# Patient Record
Sex: MALE | Race: BLACK OR AFRICAN AMERICAN | NOT HISPANIC OR LATINO | Employment: FULL TIME | URBAN - METROPOLITAN AREA
[De-identification: names, ages, dates, MRNs, and addresses within clinical notes are randomized per-mention and may not be internally consistent; named-entity substitution may affect disease eponyms.]

---

## 2017-03-16 ENCOUNTER — OFFICE VISIT (OUTPATIENT)
Dept: INTERNAL MEDICINE | Facility: CLINIC | Age: 47
End: 2017-03-16
Attending: INTERNAL MEDICINE
Payer: COMMERCIAL

## 2017-03-16 VITALS
TEMPERATURE: 99 F | BODY MASS INDEX: 31.22 KG/M2 | OXYGEN SATURATION: 98 % | HEART RATE: 87 BPM | SYSTOLIC BLOOD PRESSURE: 110 MMHG | DIASTOLIC BLOOD PRESSURE: 70 MMHG | WEIGHT: 223 LBS | HEIGHT: 71 IN

## 2017-03-16 DIAGNOSIS — Z98.84 HISTORY OF GASTRIC RESTRICTIVE SURGERY: ICD-10-CM

## 2017-03-16 DIAGNOSIS — R05.9 COUGH: ICD-10-CM

## 2017-03-16 DIAGNOSIS — Z00.00 ROUTINE ADULT HEALTH MAINTENANCE: Primary | ICD-10-CM

## 2017-03-16 PROCEDURE — 3074F SYST BP LT 130 MM HG: CPT | Mod: S$GLB,,, | Performed by: INTERNAL MEDICINE

## 2017-03-16 PROCEDURE — 99396 PREV VISIT EST AGE 40-64: CPT | Mod: S$GLB,,, | Performed by: INTERNAL MEDICINE

## 2017-03-16 PROCEDURE — 3078F DIAST BP <80 MM HG: CPT | Mod: S$GLB,,, | Performed by: INTERNAL MEDICINE

## 2017-03-16 RX ORDER — FOLIC ACID/MULTIVIT,IRON,MINER 0.4MG-18MG
1 TABLET ORAL DAILY
COMMUNITY

## 2017-03-16 NOTE — MR AVS SNAPSHOT
[facility]  [address] [place] [address], [address] [address]  Phone: [phone]  Fax: [phone]                  Dada MIKE Ted   3/16/2017 10:00 AM   Office Visit    Description:  Male : 1970   Provider:  NICHOLE Campa MD   Department:  [facility]           Reason for Visit     Follow-up     Sinus Problem     Cough     Fever                To Do List           Future Appointments        Provider Department Dept Phone    2017 10:15 AM MD [facility] [phone]      Goals (5 Years of Data)     None      Follow-Up and Disposition     Return in about 1 year (around 3/16/2018).      [facility] On Call     [facility] On Call Nurse Care Line -  Assistance  Registered nurses in the [facility] On Call Center provide clinical advisement, health education, appointment booking, and other advisory services.  Call for this free service at [phone].             Medications           Message regarding Medications     Verify the changes and/or additions to your medication regime listed below are the same as discussed with your clinician today.  If any of these changes or additions are incorrect, please notify your healthcare provider.        STOP taking these medications     VITAMIN D2 50,000 unit capsule Take 50,000 Units by mouth every 7 days.     valsartan (DIOVAN) 320 MG tablet TAKE 1 TABLET(320 MG) BY MOUTH EVERY DAY    cetirizine (ZYRTEC) 10 MG tablet Take 10 mg by mouth once daily.           Verify that the below list of medications is an accurate representation of the medications you are currently taking.  If none reported, the list may be blank. If incorrect, please contact your healthcare provider. Carry this list with you in case of emergency.           Current Medications     cholecalciferol, vitamin D3, 400 unit Chew Take 1 capsule by mouth once daily.    MULTIVIT-MINERALS/FERROUS FUM (MULTI VITAMIN ORAL) Take 4 tablets by mouth once daily.    escitalopram oxalate (LEXAPRO) 10 MG  "tablet TAKE 1 TABLET BY MOUTH DAILY    fluticasone (FLONASE) 50 mcg/actuation nasal spray INHALE TWO SPRAYS IN EACH NOSTRIL ONCE DAILY           Clinical Reference Information           Your Vitals Were     BP Pulse Temp Height Weight SpO2    110/70 87 98.7 °F (37.1 °C) 5' 11" (1.803 m) 101.2 kg (223 lb) 98%    BMI                31.1 kg/m2          Blood Pressure          Most Recent Value    BP  110/70      Allergies as of 3/16/2017     Pcn [Penicillins]      Immunizations Administered on Date of Encounter - 3/16/2017     None      Instructions    Tips for Healthy Living and Routine Preventative Care - 2017                                                             (These guidelines are intended for healthy adults)      1. Exercise  Exercise aerobically with a target heart rate of (220-age) x 0.8  Exercise 30-45 minutes on most days of the week    2. Diet and Supplements- All supplements can be obtained through a varied, healthy diet   Calcium: 1,000 - 1,200 mg each day   8 oz milk, Calcium fortified O.J., or Yogurt = 300 mg, 1oz of cheese =100-200 mg  Vitamin D: 1,000 iu each day- Can probably be obtained by 30 min. of direct sunlight    each day             3 oz. [place] = 800 iu,  3 oz. Tuna =150 iu, Milk or fortified O.J. = 120 iu  Fish oil: 1-2 grams each day or about 840 mg of EPA and DHA (Omega-3 fatty acids) each day             3 oz. [place]=2 grams,  3 oz. Tuna=1.3 grams,  3 oz. drained light Tuna= 0.25 grams  Folic acid 800 mcg each day for all women planning or capable of pregnancy  Fat intake: Not to exceed 30% of total daily calories    3. Lifestyle  Alcohol: 1 drink = 12 oz. domestic beer, 4 oz. wine, or 1 oz. hard (80 proof) liquor             Males: </= 14 drinks per week with no more than 4 in any one day             Females: </= 7 drinks per week with no more than 3 in any one day  Salt: 1.2 - 3 grams of Sodium each day.  Tobacco: Dont smoke, or quit smoking (discuss with your " doctor)  Depression: If you feel depressed discuss with your doctor  Weight: Maintain a healthy body weight. Stay within 10% of:             Males: 106 lbs. + 6 lbs per inch height above 5 feet             Females: 100 lbs + 5 lbs per inch height above 5 feet    4. Routine tests  Blood pressure check at each visit, or at least once each year  HIV screening (one time) if less than 65 years old  Hepatitis C screen (one time) if born between 1945 and 1965  Cholesterol screening every 3 years starting at age 21  Glucose check every 2-3 years starting at age 45  TSH (thyroid screen) every 2 years starting at age 50  Colonoscopy at age 50, and repeat every 10 years until age 75  Vision screen at age 65    Females:  Pap smear every three years starting at age 21                  Stop screening at age 65 if past 3 pap smears were normal                  No screening for women who have had a hysterectomy with removal of cervix  Mammogram every 1-2 years starting at age 40 until age 75 (yearly from age 45-54).  Bone density scan at about age 65      Males:  Consider PSA screening annually at age 50, age 45 for  Americans, until age 75                 5. Immunizations  Influenza vaccine every year in the fall, especially if >50 or with a chronic disease  Tetnus/Diptheria/Pertusis (Tdap) vaccine once, then Tetnus/Diptheria (Td) vaccine every 10 years  Shingles (Zoster) vaccine at age 60  Pneumonia vaccine at age 65. 2nd Pneumonia vaccine at least 1 year later (1st should be Prevnar-13, and 2nd should be Pneumovax-23).                                                       Language Assistance Services     ATTENTION: Language assistance services are available, free of charge. Please call [phone].      ATENCIÓN: Si habla español, tiene a mederos disposición servicios gratuitos de asistencia lingüística. Llame al [phone].     CHÚ Ý: N?u b?n nói Ti?ng Vi?t, có các d?ch v? h? tr? ngôn ng? mi?n phí dành cho b?n. G?i s?  [phone].         [facility] complies with applicable Federal civil rights laws and does not discriminate on the basis of race, color, national origin, age, disability, or sex.

## 2017-03-16 NOTE — PROGRESS NOTES
Subjective:       Patient ID: Dada Jean is a 46 y.o. male.    Chief Complaint: Follow-up; Sinus Problem (nata.); Cough; and Fever    HPI Comments: Lost 90 lb in past 6 mo.  Cough for 1 week.    Cough   This is a new problem. The current episode started in the past 7 days. The problem has been gradually improving. The cough is productive of sputum.     Review of Systems   Constitutional: Negative.    Respiratory: Positive for cough.    Cardiovascular: Negative.    Psychiatric/Behavioral: Negative for dysphoric mood.       Objective:      Physical Exam   Constitutional: He appears well-developed and well-nourished.   HENT:   Head: Normocephalic and atraumatic.   Eyes: Pupils are equal, round, and reactive to light.   Cardiovascular: Normal rate, regular rhythm and normal heart sounds.    Pulmonary/Chest: Effort normal.   Musculoskeletal: He exhibits no edema.   Neurological: He is alert.       Assessment:       1. Routine adult health maintenance    2. History of gastric restrictive surgery    3. Cough        Plan:       Per orders and D/C instructions.  Get copy of recent labs.

## 2017-03-16 NOTE — PATIENT INSTRUCTIONS
Tips for Healthy Living and Routine Preventative Care - 2017                                                             (These guidelines are intended for healthy adults)      1. Exercise  Exercise aerobically with a target heart rate of (220-age) x 0.8  Exercise 30-45 minutes on most days of the week    2. Diet and Supplements- All supplements can be obtained through a varied, healthy diet   Calcium: 1,000 - 1,200 mg each day   8 oz milk, Calcium fortified O.J., or Yogurt = 300 mg, 1oz of cheese =100-200 mg  Vitamin D: 1,000 iu each day- Can probably be obtained by 30 min. of direct sunlight    each day             3 oz. [place] = 800 iu,  3 oz. Tuna =150 iu, Milk or fortified O.J. = 120 iu  Fish oil: 1-2 grams each day or about 840 mg of EPA and DHA (Omega-3 fatty acids) each day             3 oz. [place]=2 grams,  3 oz. Tuna=1.3 grams,  3 oz. drained light Tuna= 0.25 grams  Folic acid 800 mcg each day for all women planning or capable of pregnancy  Fat intake: Not to exceed 30% of total daily calories    3. Lifestyle  Alcohol: 1 drink = 12 oz. domestic beer, 4 oz. wine, or 1 oz. hard (80 proof) liquor             Males: </= 14 drinks per week with no more than 4 in any one day             Females: </= 7 drinks per week with no more than 3 in any one day  Salt: 1.2 - 3 grams of Sodium each day.  Tobacco: Dont smoke, or quit smoking (discuss with your doctor)  Depression: If you feel depressed discuss with your doctor  Weight: Maintain a healthy body weight. Stay within 10% of:             Males: 106 lbs. + 6 lbs per inch height above 5 feet             Females: 100 lbs + 5 lbs per inch height above 5 feet    4. Routine tests  Blood pressure check at each visit, or at least once each year  HIV screening (one time) if less than 65 years old  Hepatitis C screen (one time) if born between 1945 and 1965  Cholesterol screening every 3 years starting at age 21  Glucose check every 2-3 years starting at age 45  TSH  (thyroid screen) every 2 years starting at age 50  Colonoscopy at age 50, and repeat every 10 years until age 75  Vision screen at age 65    Females:  Pap smear every three years starting at age 21                  Stop screening at age 65 if past 3 pap smears were normal                  No screening for women who have had a hysterectomy with removal of cervix  Mammogram every 1-2 years starting at age 40 until age 75 (yearly from age 45-54).  Bone density scan at about age 65      Males:  Consider PSA screening annually at age 50, age 45 for  Americans, until age 75                 5. Immunizations  Influenza vaccine every year in the fall, especially if >50 or with a chronic disease  Tetnus/Diptheria/Pertusis (Tdap) vaccine once, then Tetnus/Diptheria (Td) vaccine every 10 years  Shingles (Zoster) vaccine at age 60  Pneumonia vaccine at age 65. 2nd Pneumonia vaccine at least 1 year later (1st should be Prevnar-13, and 2nd should be Pneumovax-23).

## 2017-12-11 ENCOUNTER — OFFICE VISIT (OUTPATIENT)
Dept: INTERNAL MEDICINE | Facility: CLINIC | Age: 47
End: 2017-12-11
Attending: INTERNAL MEDICINE
Payer: COMMERCIAL

## 2017-12-11 VITALS
SYSTOLIC BLOOD PRESSURE: 112 MMHG | OXYGEN SATURATION: 98 % | BODY MASS INDEX: 29.4 KG/M2 | DIASTOLIC BLOOD PRESSURE: 72 MMHG | HEIGHT: 71 IN | WEIGHT: 210 LBS | HEART RATE: 57 BPM

## 2017-12-11 DIAGNOSIS — Z98.84 HISTORY OF GASTRIC RESTRICTIVE SURGERY: ICD-10-CM

## 2017-12-11 DIAGNOSIS — E55.9 VITAMIN D DEFICIENCY: Primary | ICD-10-CM

## 2017-12-11 PROCEDURE — 99213 OFFICE O/P EST LOW 20 MIN: CPT | Mod: 25,S$GLB,, | Performed by: INTERNAL MEDICINE

## 2017-12-11 PROCEDURE — 90471 IMMUNIZATION ADMIN: CPT | Mod: S$GLB,,, | Performed by: INTERNAL MEDICINE

## 2017-12-11 PROCEDURE — 90686 IIV4 VACC NO PRSV 0.5 ML IM: CPT | Mod: S$GLB,,, | Performed by: INTERNAL MEDICINE

## 2017-12-11 NOTE — PROGRESS NOTES
Subjective:       Patient ID: Dada Jean is a 47 y.o. male.    Chief Complaint: Follow-up (follow up)    Lost over 100 lb since gastric sleeve.      Review of Systems   Constitutional: Negative.    Respiratory: Negative.    Cardiovascular: Negative.        Objective:      Physical Exam   Constitutional: He appears well-developed and well-nourished.   HENT:   Head: Normocephalic and atraumatic.   Eyes: Pupils are equal, round, and reactive to light.   Cardiovascular: Normal rate, regular rhythm and normal heart sounds.    Pulmonary/Chest: Effort normal.   Musculoskeletal: He exhibits no edema.   Neurological: He is alert.       Assessment:       1. Vitamin D deficiency    2. History of gastric restrictive surgery        Plan:       Per orders and D/C instructions.  Healthy.

## 2017-12-15 DIAGNOSIS — F41.9 ANXIETY: Primary | ICD-10-CM

## 2017-12-15 RX ORDER — ESCITALOPRAM OXALATE 10 MG/1
10 TABLET ORAL DAILY
Qty: 90 TABLET | Refills: 3 | Status: SHIPPED | OUTPATIENT
Start: 2017-12-15 | End: 2018-12-19 | Stop reason: SDUPTHER

## 2018-06-21 ENCOUNTER — OFFICE VISIT (OUTPATIENT)
Dept: INTERNAL MEDICINE | Facility: CLINIC | Age: 48
End: 2018-06-21
Attending: INTERNAL MEDICINE
Payer: COMMERCIAL

## 2018-06-21 ENCOUNTER — LAB VISIT (OUTPATIENT)
Dept: LAB | Facility: OTHER | Age: 48
End: 2018-06-21
Attending: INTERNAL MEDICINE
Payer: COMMERCIAL

## 2018-06-21 VITALS
HEIGHT: 71 IN | BODY MASS INDEX: 29.4 KG/M2 | DIASTOLIC BLOOD PRESSURE: 72 MMHG | SYSTOLIC BLOOD PRESSURE: 110 MMHG | WEIGHT: 210 LBS | HEART RATE: 59 BPM | OXYGEN SATURATION: 98 %

## 2018-06-21 DIAGNOSIS — D51.0 PERNICIOUS ANEMIA: ICD-10-CM

## 2018-06-21 DIAGNOSIS — Z12.5 SCREENING FOR PROSTATE CANCER: ICD-10-CM

## 2018-06-21 DIAGNOSIS — D50.8 OTHER IRON DEFICIENCY ANEMIA: ICD-10-CM

## 2018-06-21 DIAGNOSIS — R79.89 OTHER SPECIFIED ABNORMAL FINDINGS OF BLOOD CHEMISTRY: ICD-10-CM

## 2018-06-21 DIAGNOSIS — E03.9 HYPOTHYROIDISM, UNSPECIFIED TYPE: ICD-10-CM

## 2018-06-21 DIAGNOSIS — E78.9 DISORDER OF LIPID METABOLISM: ICD-10-CM

## 2018-06-21 DIAGNOSIS — Z00.00 ROUTINE ADULT HEALTH MAINTENANCE: Primary | ICD-10-CM

## 2018-06-21 DIAGNOSIS — Z00.00 ROUTINE ADULT HEALTH MAINTENANCE: ICD-10-CM

## 2018-06-21 DIAGNOSIS — E78.00 HIGH CHOLESTEROL: Primary | ICD-10-CM

## 2018-06-21 DIAGNOSIS — E55.9 VITAMIN D DEFICIENCY: ICD-10-CM

## 2018-06-21 LAB
25(OH)D3+25(OH)D2 SERPL-MCNC: 55 NG/ML
ALBUMIN SERPL BCP-MCNC: 4.1 G/DL
ALP SERPL-CCNC: 63 U/L
ALT SERPL W/O P-5'-P-CCNC: 21 U/L
ANION GAP SERPL CALC-SCNC: 10 MMOL/L
AST SERPL-CCNC: 20 U/L
BASOPHILS # BLD AUTO: 0.03 K/UL
BASOPHILS NFR BLD: 0.9 %
BILIRUB SERPL-MCNC: 0.9 MG/DL
BUN SERPL-MCNC: 12 MG/DL
CALCIUM SERPL-MCNC: 9.8 MG/DL
CHLORIDE SERPL-SCNC: 103 MMOL/L
CHOLEST SERPL-MCNC: 187 MG/DL
CHOLEST/HDLC SERPL: 2.8 {RATIO}
CO2 SERPL-SCNC: 31 MMOL/L
COMPLEXED PSA SERPL-MCNC: 0.14 NG/ML
CREAT SERPL-MCNC: 1 MG/DL
DIFFERENTIAL METHOD: ABNORMAL
EOSINOPHIL # BLD AUTO: 0.1 K/UL
EOSINOPHIL NFR BLD: 4.4 %
ERYTHROCYTE [DISTWIDTH] IN BLOOD BY AUTOMATED COUNT: 12.8 %
EST. GFR  (AFRICAN AMERICAN): >60 ML/MIN/1.73 M^2
EST. GFR  (NON AFRICAN AMERICAN): >60 ML/MIN/1.73 M^2
ESTIMATED AVG GLUCOSE: 100 MG/DL
GLUCOSE SERPL-MCNC: 91 MG/DL
HBA1C MFR BLD HPLC: 5.1 %
HCT VFR BLD AUTO: 42.6 %
HDLC SERPL-MCNC: 67 MG/DL
HDLC SERPL: 35.8 %
HGB BLD-MCNC: 14.7 G/DL
IRON SERPL-MCNC: 118 UG/DL
LDLC SERPL CALC-MCNC: 109.6 MG/DL
LYMPHOCYTES # BLD AUTO: 1.2 K/UL
LYMPHOCYTES NFR BLD: 36.4 %
MCH RBC QN AUTO: 30.7 PG
MCHC RBC AUTO-ENTMCNC: 34.5 G/DL
MCV RBC AUTO: 89 FL
MONOCYTES # BLD AUTO: 0.2 K/UL
MONOCYTES NFR BLD: 5.3 %
NEUTROPHILS # BLD AUTO: 1.7 K/UL
NEUTROPHILS NFR BLD: 53 %
NONHDLC SERPL-MCNC: 120 MG/DL
PLATELET # BLD AUTO: 151 K/UL
PMV BLD AUTO: 11.1 FL
POTASSIUM SERPL-SCNC: 4.4 MMOL/L
PROT SERPL-MCNC: 7.2 G/DL
RBC # BLD AUTO: 4.79 M/UL
SATURATED IRON: 34 %
SODIUM SERPL-SCNC: 144 MMOL/L
TOTAL IRON BINDING CAPACITY: 345 UG/DL
TRANSFERRIN SERPL-MCNC: 233 MG/DL
TRIGL SERPL-MCNC: 52 MG/DL
TSH SERPL DL<=0.005 MIU/L-ACNC: 1.44 UIU/ML
VIT B12 SERPL-MCNC: 353 PG/ML
WBC # BLD AUTO: 3.19 K/UL

## 2018-06-21 PROCEDURE — 84153 ASSAY OF PSA TOTAL: CPT

## 2018-06-21 PROCEDURE — 3074F SYST BP LT 130 MM HG: CPT | Mod: CPTII,S$GLB,, | Performed by: INTERNAL MEDICINE

## 2018-06-21 PROCEDURE — 80053 COMPREHEN METABOLIC PANEL: CPT

## 2018-06-21 PROCEDURE — 99396 PREV VISIT EST AGE 40-64: CPT | Mod: S$GLB,,, | Performed by: INTERNAL MEDICINE

## 2018-06-21 PROCEDURE — 3078F DIAST BP <80 MM HG: CPT | Mod: CPTII,S$GLB,, | Performed by: INTERNAL MEDICINE

## 2018-06-21 PROCEDURE — 82607 VITAMIN B-12: CPT

## 2018-06-21 PROCEDURE — 85025 COMPLETE CBC W/AUTO DIFF WBC: CPT

## 2018-06-21 PROCEDURE — 84443 ASSAY THYROID STIM HORMONE: CPT

## 2018-06-21 PROCEDURE — 83036 HEMOGLOBIN GLYCOSYLATED A1C: CPT

## 2018-06-21 PROCEDURE — 83540 ASSAY OF IRON: CPT

## 2018-06-21 PROCEDURE — 82306 VITAMIN D 25 HYDROXY: CPT

## 2018-06-21 PROCEDURE — 84425 ASSAY OF VITAMIN B-1: CPT

## 2018-06-21 PROCEDURE — 80061 LIPID PANEL: CPT

## 2018-06-21 NOTE — PROGRESS NOTES
Subjective:       Patient ID: Dada Jean is a 47 y.o. male.    Chief Complaint: Follow-up (6 month) and Hand Pain (right and left thumb joints)    Left thumb pain.      Adult Wellness Exam:    Mental Conditions: None  Depression Risk Annual Factors: None  BMI: See Vital signs   Colon screen:    See Health Maintenance Report      Females: Mammogram and PAP: per Gyn                                 Vaccines (Flu, Adacel, Shingrix): See Health Maintenance Report  Routine labs (Cholesterol, Glucose/Hgb A1C, and TSH): Done or ordered.     The patient's current health status is: Good   Patient was educated on all medical problems and routine health maintanence. See Patient Instructions.                               Review of Systems   Constitutional: Negative.    Respiratory: Negative.    Cardiovascular: Negative.        Objective:      Physical Exam   Constitutional: He appears well-developed and well-nourished.   HENT:   Head: Normocephalic and atraumatic.   Eyes: Pupils are equal, round, and reactive to light.   Cardiovascular: Normal rate, regular rhythm and normal heart sounds.    Pulmonary/Chest: Effort normal.   Musculoskeletal: He exhibits no edema.        Left hand: He exhibits tenderness.        Hands:  Tender thumb tendon   Neurological: He is alert.       Assessment:       1. High cholesterol    2. Routine adult health maintenance        Plan:       Per orders and D/C instructions.     Wear a left thumb splint during the day  Check labs.

## 2018-12-19 DIAGNOSIS — F41.9 ANXIETY: ICD-10-CM

## 2018-12-19 RX ORDER — ESCITALOPRAM OXALATE 10 MG/1
TABLET ORAL
Qty: 90 TABLET | Refills: 0 | Status: SHIPPED | OUTPATIENT
Start: 2018-12-19 | End: 2019-03-22 | Stop reason: SDUPTHER

## 2018-12-21 ENCOUNTER — OFFICE VISIT (OUTPATIENT)
Dept: INTERNAL MEDICINE | Facility: CLINIC | Age: 48
End: 2018-12-21
Attending: INTERNAL MEDICINE
Payer: COMMERCIAL

## 2018-12-21 VITALS
SYSTOLIC BLOOD PRESSURE: 118 MMHG | BODY MASS INDEX: 30.94 KG/M2 | HEIGHT: 71 IN | OXYGEN SATURATION: 97 % | HEART RATE: 78 BPM | WEIGHT: 221 LBS | DIASTOLIC BLOOD PRESSURE: 80 MMHG

## 2018-12-21 DIAGNOSIS — E78.00 HIGH CHOLESTEROL: Primary | ICD-10-CM

## 2018-12-21 DIAGNOSIS — R05.9 COUGH: ICD-10-CM

## 2018-12-21 PROCEDURE — 99213 OFFICE O/P EST LOW 20 MIN: CPT | Mod: 25,S$GLB,, | Performed by: INTERNAL MEDICINE

## 2018-12-21 PROCEDURE — 90471 IMMUNIZATION ADMIN: CPT | Mod: S$GLB,,, | Performed by: INTERNAL MEDICINE

## 2018-12-21 PROCEDURE — 90686 IIV4 VACC NO PRSV 0.5 ML IM: CPT | Mod: S$GLB,,, | Performed by: INTERNAL MEDICINE

## 2018-12-21 PROCEDURE — 3079F DIAST BP 80-89 MM HG: CPT | Mod: CPTII,S$GLB,, | Performed by: INTERNAL MEDICINE

## 2018-12-21 PROCEDURE — 3008F BODY MASS INDEX DOCD: CPT | Mod: CPTII,S$GLB,, | Performed by: INTERNAL MEDICINE

## 2018-12-21 PROCEDURE — 3074F SYST BP LT 130 MM HG: CPT | Mod: CPTII,S$GLB,, | Performed by: INTERNAL MEDICINE

## 2018-12-21 NOTE — PROGRESS NOTES
Subjective:       Patient ID: Dada Jean is a 48 y.o. male.    Chief Complaint: Follow-up (6 month) and Cough    Cough   This is a new problem. The current episode started 1 to 4 weeks ago. The problem has been resolved. The cough is productive of sputum.     Review of Systems   Constitutional: Negative.    Respiratory: Positive for cough.    Cardiovascular: Negative.        Objective:      Physical Exam   Constitutional: He appears well-developed and well-nourished.   HENT:   Head: Normocephalic and atraumatic.   Eyes: Pupils are equal, round, and reactive to light.   Cardiovascular: Normal rate, regular rhythm and normal heart sounds.   Pulmonary/Chest: Effort normal.   Musculoskeletal: He exhibits no edema.   Neurological: He is alert.       Assessment:       1. High cholesterol    2. Cough        Plan:       Per orders and D/C instructions.  Doing well.

## 2019-03-22 DIAGNOSIS — F41.9 ANXIETY: ICD-10-CM

## 2019-03-22 RX ORDER — ESCITALOPRAM OXALATE 10 MG/1
TABLET ORAL
Qty: 90 TABLET | Refills: 1 | Status: SHIPPED | OUTPATIENT
Start: 2019-03-22 | End: 2019-08-01 | Stop reason: SDUPTHER

## 2019-06-21 ENCOUNTER — OFFICE VISIT (OUTPATIENT)
Dept: INTERNAL MEDICINE | Facility: CLINIC | Age: 49
End: 2019-06-21
Attending: INTERNAL MEDICINE
Payer: COMMERCIAL

## 2019-06-21 ENCOUNTER — LAB VISIT (OUTPATIENT)
Dept: LAB | Facility: OTHER | Age: 49
End: 2019-06-21
Attending: INTERNAL MEDICINE
Payer: COMMERCIAL

## 2019-06-21 VITALS
BODY MASS INDEX: 31.19 KG/M2 | SYSTOLIC BLOOD PRESSURE: 124 MMHG | WEIGHT: 222.81 LBS | DIASTOLIC BLOOD PRESSURE: 80 MMHG | HEART RATE: 45 BPM | HEIGHT: 71 IN | OXYGEN SATURATION: 99 %

## 2019-06-21 DIAGNOSIS — E55.9 VITAMIN D DEFICIENCY: ICD-10-CM

## 2019-06-21 DIAGNOSIS — Z00.00 ROUTINE ADULT HEALTH MAINTENANCE: ICD-10-CM

## 2019-06-21 DIAGNOSIS — R79.89 OTHER SPECIFIED ABNORMAL FINDINGS OF BLOOD CHEMISTRY: ICD-10-CM

## 2019-06-21 DIAGNOSIS — D51.0 PERNICIOUS ANEMIA: ICD-10-CM

## 2019-06-21 DIAGNOSIS — E03.9 HYPOTHYROIDISM, UNSPECIFIED TYPE: ICD-10-CM

## 2019-06-21 DIAGNOSIS — Z00.00 ROUTINE ADULT HEALTH MAINTENANCE: Primary | ICD-10-CM

## 2019-06-21 DIAGNOSIS — Z12.5 SCREENING FOR PROSTATE CANCER: ICD-10-CM

## 2019-06-21 DIAGNOSIS — D50.8 OTHER IRON DEFICIENCY ANEMIA: ICD-10-CM

## 2019-06-21 DIAGNOSIS — E78.9 DISORDER OF LIPID METABOLISM: ICD-10-CM

## 2019-06-21 DIAGNOSIS — E78.00 HIGH CHOLESTEROL: Primary | ICD-10-CM

## 2019-06-21 LAB
25(OH)D3+25(OH)D2 SERPL-MCNC: 49 NG/ML (ref 30–96)
ALBUMIN SERPL BCP-MCNC: 4.3 G/DL (ref 3.5–5.2)
ALP SERPL-CCNC: 65 U/L (ref 55–135)
ALT SERPL W/O P-5'-P-CCNC: 22 U/L (ref 10–44)
ANION GAP SERPL CALC-SCNC: 7 MMOL/L (ref 8–16)
AST SERPL-CCNC: 21 U/L (ref 10–40)
BASOPHILS # BLD AUTO: 0.03 K/UL (ref 0–0.2)
BASOPHILS NFR BLD: 0.8 % (ref 0–1.9)
BILIRUB SERPL-MCNC: 0.7 MG/DL (ref 0.1–1)
BUN SERPL-MCNC: 16 MG/DL (ref 6–20)
CALCIUM SERPL-MCNC: 9.6 MG/DL (ref 8.7–10.5)
CHLORIDE SERPL-SCNC: 103 MMOL/L (ref 95–110)
CHOLEST SERPL-MCNC: 201 MG/DL (ref 120–199)
CHOLEST/HDLC SERPL: 2.8 {RATIO} (ref 2–5)
CO2 SERPL-SCNC: 31 MMOL/L (ref 23–29)
COMPLEXED PSA SERPL-MCNC: 0.12 NG/ML (ref 0–4)
CREAT SERPL-MCNC: 1.1 MG/DL (ref 0.5–1.4)
DIFFERENTIAL METHOD: ABNORMAL
EOSINOPHIL # BLD AUTO: 0.2 K/UL (ref 0–0.5)
EOSINOPHIL NFR BLD: 4.1 % (ref 0–8)
ERYTHROCYTE [DISTWIDTH] IN BLOOD BY AUTOMATED COUNT: 13 % (ref 11.5–14.5)
EST. GFR  (AFRICAN AMERICAN): >60 ML/MIN/1.73 M^2
EST. GFR  (NON AFRICAN AMERICAN): >60 ML/MIN/1.73 M^2
ESTIMATED AVG GLUCOSE: 103 MG/DL (ref 68–131)
GLUCOSE SERPL-MCNC: 91 MG/DL (ref 70–110)
HBA1C MFR BLD HPLC: 5.2 % (ref 4–5.6)
HCT VFR BLD AUTO: 42.4 % (ref 40–54)
HDLC SERPL-MCNC: 71 MG/DL (ref 40–75)
HDLC SERPL: 35.3 % (ref 20–50)
HGB BLD-MCNC: 14.2 G/DL (ref 14–18)
LDLC SERPL CALC-MCNC: 114.8 MG/DL (ref 63–159)
LYMPHOCYTES # BLD AUTO: 1.2 K/UL (ref 1–4.8)
LYMPHOCYTES NFR BLD: 33.6 % (ref 18–48)
MCH RBC QN AUTO: 29.8 PG (ref 27–31)
MCHC RBC AUTO-ENTMCNC: 33.5 G/DL (ref 32–36)
MCV RBC AUTO: 89 FL (ref 82–98)
MONOCYTES # BLD AUTO: 0.2 K/UL (ref 0.3–1)
MONOCYTES NFR BLD: 6.1 % (ref 4–15)
NEUTROPHILS # BLD AUTO: 2 K/UL (ref 1.8–7.7)
NEUTROPHILS NFR BLD: 55.4 % (ref 38–73)
NONHDLC SERPL-MCNC: 130 MG/DL
PLATELET # BLD AUTO: 167 K/UL (ref 150–350)
PMV BLD AUTO: 11.3 FL (ref 9.2–12.9)
POTASSIUM SERPL-SCNC: 4.6 MMOL/L (ref 3.5–5.1)
PROT SERPL-MCNC: 7.4 G/DL (ref 6–8.4)
RBC # BLD AUTO: 4.77 M/UL (ref 4.6–6.2)
SODIUM SERPL-SCNC: 141 MMOL/L (ref 136–145)
TRIGL SERPL-MCNC: 76 MG/DL (ref 30–150)
TSH SERPL DL<=0.005 MIU/L-ACNC: 1.27 UIU/ML (ref 0.4–4)
VIT B12 SERPL-MCNC: 400 PG/ML (ref 210–950)
WBC # BLD AUTO: 3.63 K/UL (ref 3.9–12.7)

## 2019-06-21 PROCEDURE — 99396 PR PREVENTIVE VISIT,EST,40-64: ICD-10-PCS | Mod: S$GLB,,, | Performed by: INTERNAL MEDICINE

## 2019-06-21 PROCEDURE — 82306 VITAMIN D 25 HYDROXY: CPT

## 2019-06-21 PROCEDURE — 84443 ASSAY THYROID STIM HORMONE: CPT

## 2019-06-21 PROCEDURE — 84153 ASSAY OF PSA TOTAL: CPT

## 2019-06-21 PROCEDURE — 3074F PR MOST RECENT SYSTOLIC BLOOD PRESSURE < 130 MM HG: ICD-10-PCS | Mod: CPTII,S$GLB,, | Performed by: INTERNAL MEDICINE

## 2019-06-21 PROCEDURE — 80053 COMPREHEN METABOLIC PANEL: CPT

## 2019-06-21 PROCEDURE — 3079F DIAST BP 80-89 MM HG: CPT | Mod: CPTII,S$GLB,, | Performed by: INTERNAL MEDICINE

## 2019-06-21 PROCEDURE — 36415 COLL VENOUS BLD VENIPUNCTURE: CPT

## 2019-06-21 PROCEDURE — 85025 COMPLETE CBC W/AUTO DIFF WBC: CPT

## 2019-06-21 PROCEDURE — 3079F PR MOST RECENT DIASTOLIC BLOOD PRESSURE 80-89 MM HG: ICD-10-PCS | Mod: CPTII,S$GLB,, | Performed by: INTERNAL MEDICINE

## 2019-06-21 PROCEDURE — 99396 PREV VISIT EST AGE 40-64: CPT | Mod: S$GLB,,, | Performed by: INTERNAL MEDICINE

## 2019-06-21 PROCEDURE — 3074F SYST BP LT 130 MM HG: CPT | Mod: CPTII,S$GLB,, | Performed by: INTERNAL MEDICINE

## 2019-06-21 PROCEDURE — 82607 VITAMIN B-12: CPT

## 2019-06-21 PROCEDURE — 80061 LIPID PANEL: CPT

## 2019-06-21 PROCEDURE — 83036 HEMOGLOBIN GLYCOSYLATED A1C: CPT

## 2019-06-21 NOTE — PATIENT INSTRUCTIONS
Tips for Healthy Living and Routine Preventative Care - 2019                                                            (These guidelines are intended for healthy adults)      1. Exercise  Exercise aerobically with a target heart rate of (220-age) x 0.8  Exercise 30-45 minutes on most days of the week    2. Diet and Supplements- All supplements can be obtained through a varied, healthy diet   Calcium: 1,000 - 1,200 mg each day   8 oz milk, Calcium fortified O.J., or Yogurt = 300 mg, 1oz of cheese =100-200 mg  Vitamin D: 800 iu each day- Can probably be obtained by 30 min. of direct sunlight    each day             3 oz. [place] = 800 iu,  3 oz. Tuna =150 iu, Milk or fortified O.J. = 120 iu  Fish oil: 1-2 grams each day or about 840 mg of EPA and DHA (Omega-3 fatty acids) each day             3 oz. [place]=2 grams,  3 oz. Tuna=1.3 grams,  3 oz. drained light Tuna= 0.25 grams  Folic acid 800 mcg each day for all women planning or capable of pregnancy    3. Lifestyle  Alcohol: 1 drink = 12 oz. domestic beer, 4 oz. wine, or 1 oz. hard (80 proof) liquor             Males: </= 14 drinks per week with no more than 4 in any one day             Females: </= 7 drinks per week with no more than 3 in any one day  Salt: 1.2 - 3 grams of Sodium each day.  Tobacco: Dont smoke, or quit smoking (discuss with your doctor)  Depression: If you feel depressed discuss with your doctor  Weight: Maintain a healthy body weight. Stay within 10% of:             Males: 106 lbs. + 6 lbs per inch height above 5 feet             Females: 100 lbs + 5 lbs per inch height above 5 feet    4. Routine tests  Blood pressure check at each visit, or at least once each year  HIV screening (one time) if less than 65 years old  Hepatitis C screen (one time) if born between 1945 and 1965  Cholesterol screening every 3 years starting at age 21  Glucose check every 2-3 years starting at age 45  TSH (thyroid screen) every 2 years starting at age 50  Colonoscopy  at age 50, and repeat every 10 years until age 75  Vision screen at age 65    Females:  Pap smear every three years starting at age 21                  Stop screening at age 65 if past 3 pap smears were normal                  No screening for women who have had a hysterectomy with removal of cervix  Mammogram every 1-2 years starting at age 40 until age 75 (yearly from age 45-54).  Bone density scan at about age 65      Males:  Consider PSA screening annually at age 50, age 45 for  Americans, until age 75                 5. Immunizations  Influenza vaccine every year in the fall, especially if >50 or with a chronic disease  Tetnus/Diptheria/Pertusis (Tdap) vaccine once (after the age of 18), then Tetnus/Diptheria (Td) vaccine every 10 years  Shingles (Shingrix) vaccine after age 50 and get a 2nd dose after 2-6 months  Pneumonia vaccine at age 65. 2nd Pneumonia vaccine at least 1 year later (1st should be Prevnar-13, and 2nd should be Pneumovax-23).

## 2019-06-21 NOTE — PROGRESS NOTES
Subjective:       Patient ID: Dada Jean is a 48 y.o. male.    Chief Complaint: Follow-up (6 month / wants bloodwork)      Adult Wellness Exam:    Mental Conditions: None  Depression Risk Annual Factors: None  BMI: See Vital signs   Colon screen:    See Health Maintenance Report      Females: Mammogram and PAP: per Gyn                                 Vaccines (Flu, Adacel, Shingrix): See Health Maintenance Report  Routine labs (Cholesterol, Glucose/Hgb A1C, and TSH): Done or ordered.     The patient's current health status is: Good   Patient was educated on all medical problems and routine health maintanence. See Patient Instructions.                             Review of Systems   Constitutional: Negative.    Respiratory: Negative.    Cardiovascular: Negative.        Objective:      Physical Exam   Constitutional: He appears well-developed and well-nourished.   HENT:   Head: Normocephalic and atraumatic.   Eyes: Pupils are equal, round, and reactive to light.   Cardiovascular: Normal rate, regular rhythm and normal heart sounds.   Pulmonary/Chest: Effort normal.   Musculoskeletal: He exhibits no edema.   Neurological: He is alert.       Assessment:       1. High cholesterol    2. Routine adult health maintenance        Plan:       Per orders and D/C instructions.  Check labs for high cholesterol.

## 2019-08-01 DIAGNOSIS — F41.9 ANXIETY: ICD-10-CM

## 2019-08-01 RX ORDER — ESCITALOPRAM OXALATE 10 MG/1
TABLET ORAL
Qty: 90 TABLET | Refills: 1 | Status: SHIPPED | OUTPATIENT
Start: 2019-08-01 | End: 2019-10-08

## 2019-10-08 DIAGNOSIS — F41.9 ANXIETY: ICD-10-CM

## 2019-10-08 RX ORDER — ESCITALOPRAM OXALATE 10 MG/1
TABLET ORAL
Qty: 90 TABLET | Refills: 1 | Status: SHIPPED | OUTPATIENT
Start: 2019-10-08 | End: 2020-03-28

## 2019-11-25 ENCOUNTER — OFFICE VISIT (OUTPATIENT)
Dept: INTERNAL MEDICINE | Facility: CLINIC | Age: 49
End: 2019-11-25
Attending: INTERNAL MEDICINE
Payer: COMMERCIAL

## 2019-11-25 VITALS
WEIGHT: 238 LBS | BODY MASS INDEX: 33.32 KG/M2 | SYSTOLIC BLOOD PRESSURE: 120 MMHG | DIASTOLIC BLOOD PRESSURE: 80 MMHG | HEIGHT: 71 IN | HEART RATE: 64 BPM | OXYGEN SATURATION: 98 %

## 2019-11-25 DIAGNOSIS — K62.5 BRIGHT RED RECTAL BLEEDING: ICD-10-CM

## 2019-11-25 DIAGNOSIS — Z00.00 ROUTINE ADULT HEALTH MAINTENANCE: ICD-10-CM

## 2019-11-25 DIAGNOSIS — M25.561 ACUTE PAIN OF RIGHT KNEE: ICD-10-CM

## 2019-11-25 DIAGNOSIS — Z98.84 HISTORY OF GASTRIC RESTRICTIVE SURGERY: Primary | ICD-10-CM

## 2019-11-25 DIAGNOSIS — E78.00 HIGH CHOLESTEROL: ICD-10-CM

## 2019-11-25 PROCEDURE — 99214 PR OFFICE/OUTPT VISIT, EST, LEVL IV, 30-39 MIN: ICD-10-PCS | Mod: S$GLB,,, | Performed by: INTERNAL MEDICINE

## 2019-11-25 PROCEDURE — 3079F PR MOST RECENT DIASTOLIC BLOOD PRESSURE 80-89 MM HG: ICD-10-PCS | Mod: CPTII,S$GLB,, | Performed by: INTERNAL MEDICINE

## 2019-11-25 PROCEDURE — 3074F PR MOST RECENT SYSTOLIC BLOOD PRESSURE < 130 MM HG: ICD-10-PCS | Mod: CPTII,S$GLB,, | Performed by: INTERNAL MEDICINE

## 2019-11-25 PROCEDURE — 99214 OFFICE O/P EST MOD 30 MIN: CPT | Mod: S$GLB,,, | Performed by: INTERNAL MEDICINE

## 2019-11-25 PROCEDURE — 3008F PR BODY MASS INDEX (BMI) DOCUMENTED: ICD-10-PCS | Mod: CPTII,S$GLB,, | Performed by: INTERNAL MEDICINE

## 2019-11-25 PROCEDURE — 3079F DIAST BP 80-89 MM HG: CPT | Mod: CPTII,S$GLB,, | Performed by: INTERNAL MEDICINE

## 2019-11-25 PROCEDURE — 3008F BODY MASS INDEX DOCD: CPT | Mod: CPTII,S$GLB,, | Performed by: INTERNAL MEDICINE

## 2019-11-25 PROCEDURE — 3074F SYST BP LT 130 MM HG: CPT | Mod: CPTII,S$GLB,, | Performed by: INTERNAL MEDICINE

## 2019-11-25 NOTE — PROGRESS NOTES
Subjective:       Patient ID: Dada Jean is a 49 y.o. male.    Chief Complaint: Follow-up (6 month); Rectal Bleeding (in stools ); and Knee Injury (fell and hit knee, swollen, tender to touch)    He fell 1 week ago and landed on his right knee.  The knee has been swollen.  A friend of his who is an EMT put a needle and drained a bit of blood.  It does seem to be improving.  3 times he had a bowel movement which resulted in some bright red blood on the paper, but not in the bowl or stool.  It has since resolved.    Rectal Bleeding   This is a new problem. The current episode started in the past 7 days. The problem occurs daily. The problem has been resolved. Associated symptoms include arthralgias.   Knee Injury   This is a new problem. The current episode started in the past 7 days. Associated symptoms include arthralgias.     Review of Systems   Constitutional: Negative.    Respiratory: Negative.    Cardiovascular: Negative.    Gastrointestinal: Positive for anal bleeding and hematochezia.   Musculoskeletal: Positive for arthralgias.       Objective:      Physical Exam   Constitutional: He appears well-developed and well-nourished.   HENT:   Head: Normocephalic and atraumatic.   Eyes: Pupils are equal, round, and reactive to light.   Cardiovascular: Normal rate, regular rhythm and normal heart sounds.   Pulmonary/Chest: Effort normal.   Musculoskeletal: He exhibits no edema.        Right knee: He exhibits swelling.   Neurological: He is alert.       Assessment:       1. History of gastric restrictive surgery    2. High cholesterol    3. Routine adult health maintenance    4. Acute pain of right knee    5. Bright red rectal bleeding        Plan:       Per orders and D/C instructions.  Continue meds/diet for S/p Gastric sleeve and high cholesterol, which are stable.     he will use tucks pads as needed.  He will make an appointment with GI for a colonoscopy.  He will wear a soft knee brace for right knee pain  during the day.

## 2020-03-27 DIAGNOSIS — F41.9 ANXIETY: ICD-10-CM

## 2020-03-28 RX ORDER — ESCITALOPRAM OXALATE 10 MG/1
TABLET ORAL
Qty: 90 TABLET | Refills: 1 | Status: SHIPPED | OUTPATIENT
Start: 2020-03-28 | End: 2020-11-01

## 2020-06-25 ENCOUNTER — OFFICE VISIT (OUTPATIENT)
Dept: INTERNAL MEDICINE | Facility: CLINIC | Age: 50
End: 2020-06-25
Attending: INTERNAL MEDICINE
Payer: COMMERCIAL

## 2020-06-25 ENCOUNTER — LAB VISIT (OUTPATIENT)
Dept: LAB | Facility: OTHER | Age: 50
End: 2020-06-25
Attending: INTERNAL MEDICINE
Payer: COMMERCIAL

## 2020-06-25 VITALS
OXYGEN SATURATION: 98 % | BODY MASS INDEX: 33.18 KG/M2 | SYSTOLIC BLOOD PRESSURE: 110 MMHG | DIASTOLIC BLOOD PRESSURE: 70 MMHG | HEART RATE: 78 BPM | HEIGHT: 71 IN | WEIGHT: 237 LBS

## 2020-06-25 DIAGNOSIS — D51.0 PERNICIOUS ANEMIA: ICD-10-CM

## 2020-06-25 DIAGNOSIS — E78.9 DISORDER OF LIPID METABOLISM: ICD-10-CM

## 2020-06-25 DIAGNOSIS — R79.89 OTHER SPECIFIED ABNORMAL FINDINGS OF BLOOD CHEMISTRY: ICD-10-CM

## 2020-06-25 DIAGNOSIS — Z00.00 ROUTINE ADULT HEALTH MAINTENANCE: ICD-10-CM

## 2020-06-25 DIAGNOSIS — Z12.5 SCREENING FOR PROSTATE CANCER: ICD-10-CM

## 2020-06-25 DIAGNOSIS — D50.8 OTHER IRON DEFICIENCY ANEMIA: ICD-10-CM

## 2020-06-25 DIAGNOSIS — E55.9 VITAMIN D DEFICIENCY: ICD-10-CM

## 2020-06-25 DIAGNOSIS — E03.9 HYPOTHYROIDISM, UNSPECIFIED TYPE: ICD-10-CM

## 2020-06-25 DIAGNOSIS — Z00.00 ROUTINE ADULT HEALTH MAINTENANCE: Primary | ICD-10-CM

## 2020-06-25 DIAGNOSIS — U07.1 COVID-19: ICD-10-CM

## 2020-06-25 DIAGNOSIS — E78.00 HIGH CHOLESTEROL: Primary | ICD-10-CM

## 2020-06-25 LAB
25(OH)D3+25(OH)D2 SERPL-MCNC: 51 NG/ML (ref 30–96)
ALBUMIN SERPL BCP-MCNC: 4.1 G/DL (ref 3.5–5.2)
ALP SERPL-CCNC: 61 U/L (ref 55–135)
ALT SERPL W/O P-5'-P-CCNC: 16 U/L (ref 10–44)
ANION GAP SERPL CALC-SCNC: 9 MMOL/L (ref 8–16)
AST SERPL-CCNC: 17 U/L (ref 10–40)
BASOPHILS # BLD AUTO: 0.03 K/UL (ref 0–0.2)
BASOPHILS NFR BLD: 1 % (ref 0–1.9)
BILIRUB SERPL-MCNC: 0.5 MG/DL (ref 0.1–1)
BUN SERPL-MCNC: 14 MG/DL (ref 6–20)
CALCIUM SERPL-MCNC: 9.1 MG/DL (ref 8.7–10.5)
CHLORIDE SERPL-SCNC: 106 MMOL/L (ref 95–110)
CHOLEST SERPL-MCNC: 196 MG/DL (ref 120–199)
CHOLEST/HDLC SERPL: 3.6 {RATIO} (ref 2–5)
CO2 SERPL-SCNC: 26 MMOL/L (ref 23–29)
COMPLEXED PSA SERPL-MCNC: 0.12 NG/ML (ref 0–4)
CREAT SERPL-MCNC: 1 MG/DL (ref 0.5–1.4)
DIFFERENTIAL METHOD: ABNORMAL
EOSINOPHIL # BLD AUTO: 0.2 K/UL (ref 0–0.5)
EOSINOPHIL NFR BLD: 5 % (ref 0–8)
ERYTHROCYTE [DISTWIDTH] IN BLOOD BY AUTOMATED COUNT: 12.4 % (ref 11.5–14.5)
EST. GFR  (AFRICAN AMERICAN): >60 ML/MIN/1.73 M^2
EST. GFR  (NON AFRICAN AMERICAN): >60 ML/MIN/1.73 M^2
ESTIMATED AVG GLUCOSE: 103 MG/DL (ref 68–131)
GLUCOSE SERPL-MCNC: 74 MG/DL (ref 70–110)
HBA1C MFR BLD HPLC: 5.2 % (ref 4–5.6)
HCT VFR BLD AUTO: 42.6 % (ref 40–54)
HDLC SERPL-MCNC: 55 MG/DL (ref 40–75)
HDLC SERPL: 28.1 % (ref 20–50)
HGB BLD-MCNC: 14.2 G/DL (ref 14–18)
HIV 1+2 AB+HIV1 P24 AG SERPL QL IA: NEGATIVE
IMM GRANULOCYTES # BLD AUTO: 0 K/UL (ref 0–0.04)
IMM GRANULOCYTES NFR BLD AUTO: 0 % (ref 0–0.5)
LDLC SERPL CALC-MCNC: 127.6 MG/DL (ref 63–159)
LYMPHOCYTES # BLD AUTO: 1 K/UL (ref 1–4.8)
LYMPHOCYTES NFR BLD: 32.9 % (ref 18–48)
MCH RBC QN AUTO: 29.3 PG (ref 27–31)
MCHC RBC AUTO-ENTMCNC: 33.3 G/DL (ref 32–36)
MCV RBC AUTO: 88 FL (ref 82–98)
MONOCYTES # BLD AUTO: 0.2 K/UL (ref 0.3–1)
MONOCYTES NFR BLD: 8 % (ref 4–15)
NEUTROPHILS # BLD AUTO: 1.6 K/UL (ref 1.8–7.7)
NEUTROPHILS NFR BLD: 53.1 % (ref 38–73)
NONHDLC SERPL-MCNC: 141 MG/DL
NRBC BLD-RTO: 0 /100 WBC
PLATELET # BLD AUTO: 178 K/UL (ref 150–350)
PMV BLD AUTO: 10.9 FL (ref 9.2–12.9)
POTASSIUM SERPL-SCNC: 4 MMOL/L (ref 3.5–5.1)
PROT SERPL-MCNC: 7 G/DL (ref 6–8.4)
RBC # BLD AUTO: 4.85 M/UL (ref 4.6–6.2)
SARS-COV-2 IGG SERPLBLD QL IA.RAPID: NEGATIVE
SODIUM SERPL-SCNC: 141 MMOL/L (ref 136–145)
TRIGL SERPL-MCNC: 67 MG/DL (ref 30–150)
TSH SERPL DL<=0.005 MIU/L-ACNC: 1.17 UIU/ML (ref 0.4–4)
VIT B12 SERPL-MCNC: 382 PG/ML (ref 210–950)
WBC # BLD AUTO: 3.01 K/UL (ref 3.9–12.7)

## 2020-06-25 PROCEDURE — 83036 HEMOGLOBIN GLYCOSYLATED A1C: CPT

## 2020-06-25 PROCEDURE — 84153 ASSAY OF PSA TOTAL: CPT

## 2020-06-25 PROCEDURE — 86703 HIV-1/HIV-2 1 RESULT ANTBDY: CPT

## 2020-06-25 PROCEDURE — 36415 COLL VENOUS BLD VENIPUNCTURE: CPT

## 2020-06-25 PROCEDURE — 3074F SYST BP LT 130 MM HG: CPT | Mod: CPTII,S$GLB,, | Performed by: INTERNAL MEDICINE

## 2020-06-25 PROCEDURE — 99396 PREV VISIT EST AGE 40-64: CPT | Mod: S$GLB,,, | Performed by: INTERNAL MEDICINE

## 2020-06-25 PROCEDURE — 80053 COMPREHEN METABOLIC PANEL: CPT

## 2020-06-25 PROCEDURE — 86769 SARS-COV-2 COVID-19 ANTIBODY: CPT

## 2020-06-25 PROCEDURE — 82607 VITAMIN B-12: CPT

## 2020-06-25 PROCEDURE — 99396 PR PREVENTIVE VISIT,EST,40-64: ICD-10-PCS | Mod: S$GLB,,, | Performed by: INTERNAL MEDICINE

## 2020-06-25 PROCEDURE — 84443 ASSAY THYROID STIM HORMONE: CPT

## 2020-06-25 PROCEDURE — 84425 ASSAY OF VITAMIN B-1: CPT

## 2020-06-25 PROCEDURE — 3074F PR MOST RECENT SYSTOLIC BLOOD PRESSURE < 130 MM HG: ICD-10-PCS | Mod: CPTII,S$GLB,, | Performed by: INTERNAL MEDICINE

## 2020-06-25 PROCEDURE — 3078F PR MOST RECENT DIASTOLIC BLOOD PRESSURE < 80 MM HG: ICD-10-PCS | Mod: CPTII,S$GLB,, | Performed by: INTERNAL MEDICINE

## 2020-06-25 PROCEDURE — 3078F DIAST BP <80 MM HG: CPT | Mod: CPTII,S$GLB,, | Performed by: INTERNAL MEDICINE

## 2020-06-25 PROCEDURE — 80061 LIPID PANEL: CPT

## 2020-06-25 PROCEDURE — 85025 COMPLETE CBC W/AUTO DIFF WBC: CPT

## 2020-06-25 PROCEDURE — 82306 VITAMIN D 25 HYDROXY: CPT

## 2020-06-25 NOTE — PROGRESS NOTES
Subjective:       Patient ID: Dada Jean is a 49 y.o. male.    Chief Complaint: Follow-up      Adult Wellness Exam:    Mental Conditions: None  Depression Risk Factors: None  BMI: See Vital signs   Colon screen:    See Health Maintenance Report      Females: Mammogram and PAP: per Gyn                                 Vaccines (Flu, Adacel, Shingrix): See Health Maintenance Report  Routine labs (Cholesterol, Glucose/Hgb A1C, and TSH): Done or ordered.     The patient's current health status is: Good   Patient was educated on all medical problems and routine health maintanence. See Patient Instructions.                               Review of Systems   Constitutional: Negative.    Respiratory: Negative.    Cardiovascular: Negative.          Objective:      Physical Exam  Vitals signs and nursing note reviewed.   Constitutional:       Appearance: He is well-developed.   HENT:      Head: Normocephalic and atraumatic.   Eyes:      Pupils: Pupils are equal, round, and reactive to light.   Cardiovascular:      Rate and Rhythm: Normal rate and regular rhythm.      Heart sounds: Normal heart sounds.   Pulmonary:      Effort: Pulmonary effort is normal.   Neurological:      Mental Status: He is alert.         Assessment:       1. High cholesterol    2. Routine adult health maintenance        Plan:       Per orders and D/C instructions.     check labs for high cholesterol.

## 2020-06-25 NOTE — PATIENT INSTRUCTIONS
Tips for Healthy Living and Routine Preventative Care - 2020                                                            (These guidelines are intended for healthy adults)      1. Exercise  Exercise aerobically with a target heart rate of (220-age) x 0.8  Exercise 30-45 minutes on most days of the week    2. Diet and Supplements- All supplements can be obtained through a varied, healthy diet   Calcium: 1,000 - 1,200 mg each day   8 oz milk, Calcium fortified O.J., or Yogurt = 300 mg, 1oz of cheese =100-200 mg  Vitamin D: 800 iu each day- Can probably be obtained by 30 min. of direct sunlight    each day             3 oz. [place] = 800 iu,  3 oz. Tuna =150 iu, Milk or fortified O.J. = 120 iu  Fish oil: 1-2 grams each day or about 840 mg of EPA and DHA (Omega-3 fatty acids) each day             3 oz. [place]=2 grams,  3 oz. Tuna=1.3 grams,  3 oz. drained light Tuna= 0.25 grams  Folic acid 800 mcg each day for all women planning or capable of pregnancy    3. Lifestyle  Alcohol: 1 drink = 12 oz. domestic beer, 4 oz. wine, or 1 oz. hard (80 proof) liquor             Males: </= 14 drinks per week with no more than 4 in any one day             Females: </= 7 drinks per week with no more than 3 in any one day  Salt: 1.2 - 3 grams of Sodium each day.  Tobacco: Dont smoke, or quit smoking (discuss with your doctor)  Depression: If you feel depressed discuss with your doctor  Weight: Maintain a healthy body weight. Stay within 10% of:             Males: 106 lbs. + 6 lbs per inch height above 5 feet             Females: 100 lbs + 5 lbs per inch height above 5 feet    4. Routine tests  Blood pressure check at each visit, or at least once each year  HIV screening (one time) if less than 65 years old  Hepatitis C screen (one time) between the age of 18 and 79  Cholesterol screening every 3 years starting at age 21  Glucose check every 2-3 years starting at age 45  TSH (thyroid screen) every 2 years starting at age 50  Colonoscopy at  age 50, and repeat every 10 years until age 75  Vision screen at age 65    Females:  Pap smear every three years starting at age 21                  Stop screening at age 65 if past 3 pap smears were normal                  No screening for women who have had a hysterectomy with removal of cervix  Mammogram every 1-2 years starting at age 40 (or age 50) until age 75.  Bone density scan at about age 65      Males:  Consider PSA screening annually at age 50, age 45 for  Americans, until age 75                 5. Immunizations  Influenza vaccine every year in the fall, especially if >50 or with a chronic disease  Tetnus/Diptheria/Pertusis (Tdap) vaccine once (after the age of 18), then Tetnus/Diptheria (Td) vaccine every 10 years  Shingles (Shingrix) vaccine after age 50 and get a 2nd dose after 2-6 months  Pneumonia vaccine at age 65. 2nd Pneumonia vaccine at least 1 year later (1st should be Prevnar-13, and 2nd should be Pneumovax-23).

## 2020-06-26 PROBLEM — D70.0 CONGENITAL NEUTROPENIA: Status: ACTIVE | Noted: 2020-06-26

## 2020-07-01 LAB — VIT B1 BLD-MCNC: 46 UG/L (ref 38–122)

## 2021-06-29 ENCOUNTER — OFFICE VISIT (OUTPATIENT)
Dept: INTERNAL MEDICINE | Facility: CLINIC | Age: 51
End: 2021-06-29
Attending: INTERNAL MEDICINE
Payer: COMMERCIAL

## 2021-06-29 ENCOUNTER — LAB VISIT (OUTPATIENT)
Dept: LAB | Facility: OTHER | Age: 51
End: 2021-06-29
Attending: INTERNAL MEDICINE
Payer: COMMERCIAL

## 2021-06-29 VITALS
OXYGEN SATURATION: 97 % | WEIGHT: 243 LBS | HEIGHT: 71 IN | BODY MASS INDEX: 34.02 KG/M2 | HEART RATE: 68 BPM | DIASTOLIC BLOOD PRESSURE: 78 MMHG | SYSTOLIC BLOOD PRESSURE: 120 MMHG

## 2021-06-29 DIAGNOSIS — E78.9 DISORDER OF LIPID METABOLISM: ICD-10-CM

## 2021-06-29 DIAGNOSIS — R79.89 OTHER SPECIFIED ABNORMAL FINDINGS OF BLOOD CHEMISTRY: ICD-10-CM

## 2021-06-29 DIAGNOSIS — Z00.00 ROUTINE ADULT HEALTH MAINTENANCE: Primary | ICD-10-CM

## 2021-06-29 DIAGNOSIS — E55.9 VITAMIN D DEFICIENCY: ICD-10-CM

## 2021-06-29 DIAGNOSIS — E03.9 HYPOTHYROIDISM, UNSPECIFIED TYPE: ICD-10-CM

## 2021-06-29 DIAGNOSIS — Z12.5 SCREENING FOR PROSTATE CANCER: ICD-10-CM

## 2021-06-29 DIAGNOSIS — D51.0 PERNICIOUS ANEMIA: ICD-10-CM

## 2021-06-29 DIAGNOSIS — D50.8 OTHER IRON DEFICIENCY ANEMIA: ICD-10-CM

## 2021-06-29 DIAGNOSIS — M25.562 PAIN IN BOTH KNEES, UNSPECIFIED CHRONICITY: ICD-10-CM

## 2021-06-29 DIAGNOSIS — Z00.00 ROUTINE ADULT HEALTH MAINTENANCE: ICD-10-CM

## 2021-06-29 DIAGNOSIS — M25.561 PAIN IN BOTH KNEES, UNSPECIFIED CHRONICITY: ICD-10-CM

## 2021-06-29 DIAGNOSIS — E55.9 VITAMIN D DEFICIENCY: Primary | ICD-10-CM

## 2021-06-29 DIAGNOSIS — E78.00 HIGH CHOLESTEROL: ICD-10-CM

## 2021-06-29 LAB
25(OH)D3+25(OH)D2 SERPL-MCNC: 46 NG/ML (ref 30–96)
ALBUMIN SERPL BCP-MCNC: 4.1 G/DL (ref 3.5–5.2)
ALP SERPL-CCNC: 60 U/L (ref 55–135)
ALT SERPL W/O P-5'-P-CCNC: 12 U/L (ref 10–44)
ANION GAP SERPL CALC-SCNC: 10 MMOL/L (ref 8–16)
AST SERPL-CCNC: 18 U/L (ref 10–40)
BASOPHILS # BLD AUTO: 0.05 K/UL (ref 0–0.2)
BASOPHILS NFR BLD: 1.5 % (ref 0–1.9)
BILIRUB SERPL-MCNC: 0.7 MG/DL (ref 0.1–1)
BUN SERPL-MCNC: 15 MG/DL (ref 6–20)
CALCIUM SERPL-MCNC: 9.3 MG/DL (ref 8.7–10.5)
CHLORIDE SERPL-SCNC: 104 MMOL/L (ref 95–110)
CHOLEST SERPL-MCNC: 213 MG/DL (ref 120–199)
CHOLEST/HDLC SERPL: 3.7 {RATIO} (ref 2–5)
CO2 SERPL-SCNC: 27 MMOL/L (ref 23–29)
COMPLEXED PSA SERPL-MCNC: 0.13 NG/ML (ref 0–4)
CREAT SERPL-MCNC: 1 MG/DL (ref 0.5–1.4)
DIFFERENTIAL METHOD: ABNORMAL
EOSINOPHIL # BLD AUTO: 0.2 K/UL (ref 0–0.5)
EOSINOPHIL NFR BLD: 4.7 % (ref 0–8)
ERYTHROCYTE [DISTWIDTH] IN BLOOD BY AUTOMATED COUNT: 12.4 % (ref 11.5–14.5)
EST. GFR  (AFRICAN AMERICAN): >60 ML/MIN/1.73 M^2
EST. GFR  (NON AFRICAN AMERICAN): >60 ML/MIN/1.73 M^2
ESTIMATED AVG GLUCOSE: 100 MG/DL (ref 68–131)
GLUCOSE SERPL-MCNC: 85 MG/DL (ref 70–110)
HBA1C MFR BLD: 5.1 % (ref 4–5.6)
HCT VFR BLD AUTO: 43.5 % (ref 40–54)
HDLC SERPL-MCNC: 58 MG/DL (ref 40–75)
HDLC SERPL: 27.2 % (ref 20–50)
HGB BLD-MCNC: 14.9 G/DL (ref 14–18)
IMM GRANULOCYTES # BLD AUTO: 0 K/UL (ref 0–0.04)
IMM GRANULOCYTES NFR BLD AUTO: 0 % (ref 0–0.5)
LDLC SERPL CALC-MCNC: 138.2 MG/DL (ref 63–159)
LYMPHOCYTES # BLD AUTO: 1.2 K/UL (ref 1–4.8)
LYMPHOCYTES NFR BLD: 35.9 % (ref 18–48)
MCH RBC QN AUTO: 29.9 PG (ref 27–31)
MCHC RBC AUTO-ENTMCNC: 34.3 G/DL (ref 32–36)
MCV RBC AUTO: 87 FL (ref 82–98)
MONOCYTES # BLD AUTO: 0.3 K/UL (ref 0.3–1)
MONOCYTES NFR BLD: 7.9 % (ref 4–15)
NEUTROPHILS # BLD AUTO: 1.7 K/UL (ref 1.8–7.7)
NEUTROPHILS NFR BLD: 50 % (ref 38–73)
NONHDLC SERPL-MCNC: 155 MG/DL
NRBC BLD-RTO: 0 /100 WBC
PLATELET # BLD AUTO: 163 K/UL (ref 150–450)
PMV BLD AUTO: 11.2 FL (ref 9.2–12.9)
POTASSIUM SERPL-SCNC: 4.3 MMOL/L (ref 3.5–5.1)
PROT SERPL-MCNC: 7.3 G/DL (ref 6–8.4)
RBC # BLD AUTO: 4.98 M/UL (ref 4.6–6.2)
SODIUM SERPL-SCNC: 141 MMOL/L (ref 136–145)
TRIGL SERPL-MCNC: 84 MG/DL (ref 30–150)
TSH SERPL DL<=0.005 MIU/L-ACNC: 2 UIU/ML (ref 0.4–4)
VIT B12 SERPL-MCNC: 354 PG/ML (ref 210–950)
WBC # BLD AUTO: 3.43 K/UL (ref 3.9–12.7)

## 2021-06-29 PROCEDURE — 80061 LIPID PANEL: CPT | Performed by: INTERNAL MEDICINE

## 2021-06-29 PROCEDURE — 84153 ASSAY OF PSA TOTAL: CPT | Performed by: INTERNAL MEDICINE

## 2021-06-29 PROCEDURE — 80053 COMPREHEN METABOLIC PANEL: CPT | Performed by: INTERNAL MEDICINE

## 2021-06-29 PROCEDURE — 99396 PREV VISIT EST AGE 40-64: CPT | Mod: S$GLB,,, | Performed by: INTERNAL MEDICINE

## 2021-06-29 PROCEDURE — 82306 VITAMIN D 25 HYDROXY: CPT | Performed by: INTERNAL MEDICINE

## 2021-06-29 PROCEDURE — 99396 PR PREVENTIVE VISIT,EST,40-64: ICD-10-PCS | Mod: S$GLB,,, | Performed by: INTERNAL MEDICINE

## 2021-06-29 PROCEDURE — 86803 HEPATITIS C AB TEST: CPT | Performed by: INTERNAL MEDICINE

## 2021-06-29 PROCEDURE — 82607 VITAMIN B-12: CPT | Performed by: INTERNAL MEDICINE

## 2021-06-29 PROCEDURE — 85025 COMPLETE CBC W/AUTO DIFF WBC: CPT | Performed by: INTERNAL MEDICINE

## 2021-06-29 PROCEDURE — 3008F PR BODY MASS INDEX (BMI) DOCUMENTED: ICD-10-PCS | Mod: CPTII,S$GLB,, | Performed by: INTERNAL MEDICINE

## 2021-06-29 PROCEDURE — 83036 HEMOGLOBIN GLYCOSYLATED A1C: CPT | Performed by: INTERNAL MEDICINE

## 2021-06-29 PROCEDURE — 84443 ASSAY THYROID STIM HORMONE: CPT | Performed by: INTERNAL MEDICINE

## 2021-06-29 PROCEDURE — 36415 COLL VENOUS BLD VENIPUNCTURE: CPT | Performed by: INTERNAL MEDICINE

## 2021-06-29 PROCEDURE — 3008F BODY MASS INDEX DOCD: CPT | Mod: CPTII,S$GLB,, | Performed by: INTERNAL MEDICINE

## 2021-06-30 LAB — HCV AB SERPL QL IA: NEGATIVE

## 2021-07-15 ENCOUNTER — OFFICE VISIT (OUTPATIENT)
Dept: URGENT CARE | Facility: CLINIC | Age: 51
End: 2021-07-15
Payer: COMMERCIAL

## 2021-07-15 VITALS
DIASTOLIC BLOOD PRESSURE: 82 MMHG | HEIGHT: 71 IN | WEIGHT: 243 LBS | RESPIRATION RATE: 18 BRPM | TEMPERATURE: 98 F | HEART RATE: 86 BPM | BODY MASS INDEX: 34.02 KG/M2 | OXYGEN SATURATION: 97 % | SYSTOLIC BLOOD PRESSURE: 134 MMHG

## 2021-07-15 DIAGNOSIS — J06.9 VIRAL URI WITH COUGH: Primary | ICD-10-CM

## 2021-07-15 LAB
CTP QC/QA: YES
SARS-COV-2 RDRP RESP QL NAA+PROBE: NEGATIVE

## 2021-07-15 PROCEDURE — U0002: ICD-10-PCS | Mod: QW,S$GLB,, | Performed by: FAMILY MEDICINE

## 2021-07-15 PROCEDURE — 3008F BODY MASS INDEX DOCD: CPT | Mod: CPTII,S$GLB,, | Performed by: FAMILY MEDICINE

## 2021-07-15 PROCEDURE — 99214 PR OFFICE/OUTPT VISIT, EST, LEVL IV, 30-39 MIN: ICD-10-PCS | Mod: S$GLB,CS,, | Performed by: FAMILY MEDICINE

## 2021-07-15 PROCEDURE — U0002 COVID-19 LAB TEST NON-CDC: HCPCS | Mod: QW,S$GLB,, | Performed by: FAMILY MEDICINE

## 2021-07-15 PROCEDURE — 3008F PR BODY MASS INDEX (BMI) DOCUMENTED: ICD-10-PCS | Mod: CPTII,S$GLB,, | Performed by: FAMILY MEDICINE

## 2021-07-15 PROCEDURE — 99214 OFFICE O/P EST MOD 30 MIN: CPT | Mod: S$GLB,CS,, | Performed by: FAMILY MEDICINE

## 2021-07-15 RX ORDER — BENZONATATE 200 MG/1
200 CAPSULE ORAL 3 TIMES DAILY PRN
Qty: 30 CAPSULE | Refills: 0 | Status: SHIPPED | OUTPATIENT
Start: 2021-07-15 | End: 2022-04-07

## 2021-11-24 ENCOUNTER — OFFICE VISIT (OUTPATIENT)
Dept: INTERNAL MEDICINE | Facility: CLINIC | Age: 51
End: 2021-11-24
Attending: INTERNAL MEDICINE
Payer: COMMERCIAL

## 2021-11-24 VITALS
BODY MASS INDEX: 35 KG/M2 | DIASTOLIC BLOOD PRESSURE: 80 MMHG | SYSTOLIC BLOOD PRESSURE: 128 MMHG | OXYGEN SATURATION: 98 % | WEIGHT: 250 LBS | HEART RATE: 74 BPM | HEIGHT: 71 IN

## 2021-11-24 DIAGNOSIS — E78.00 HIGH CHOLESTEROL: Primary | ICD-10-CM

## 2021-11-24 DIAGNOSIS — E55.9 VITAMIN D DEFICIENCY: ICD-10-CM

## 2021-11-24 DIAGNOSIS — S81.811D LACERATION OF RIGHT LOWER EXTREMITY, SUBSEQUENT ENCOUNTER: ICD-10-CM

## 2021-11-24 PROCEDURE — 90471 FLU VACCINE (QUAD) GREATER THAN OR EQUAL TO 3YO PRESERVATIVE FREE IM: ICD-10-PCS | Mod: S$GLB,,, | Performed by: INTERNAL MEDICINE

## 2021-11-24 PROCEDURE — 90471 IMMUNIZATION ADMIN: CPT | Mod: S$GLB,,, | Performed by: INTERNAL MEDICINE

## 2021-11-24 PROCEDURE — 99214 PR OFFICE/OUTPT VISIT, EST, LEVL IV, 30-39 MIN: ICD-10-PCS | Mod: 25,S$GLB,, | Performed by: INTERNAL MEDICINE

## 2021-11-24 PROCEDURE — 90686 IIV4 VACC NO PRSV 0.5 ML IM: CPT | Mod: S$GLB,,, | Performed by: INTERNAL MEDICINE

## 2021-11-24 PROCEDURE — 90686 FLU VACCINE (QUAD) GREATER THAN OR EQUAL TO 3YO PRESERVATIVE FREE IM: ICD-10-PCS | Mod: S$GLB,,, | Performed by: INTERNAL MEDICINE

## 2021-11-24 PROCEDURE — 99214 OFFICE O/P EST MOD 30 MIN: CPT | Mod: 25,S$GLB,, | Performed by: INTERNAL MEDICINE

## 2022-04-07 ENCOUNTER — OFFICE VISIT (OUTPATIENT)
Dept: INTERNAL MEDICINE | Facility: CLINIC | Age: 52
End: 2022-04-07
Attending: INTERNAL MEDICINE
Payer: COMMERCIAL

## 2022-04-07 VITALS
HEIGHT: 71 IN | OXYGEN SATURATION: 96 % | DIASTOLIC BLOOD PRESSURE: 70 MMHG | BODY MASS INDEX: 34.72 KG/M2 | HEART RATE: 82 BPM | WEIGHT: 248 LBS | SYSTOLIC BLOOD PRESSURE: 122 MMHG

## 2022-04-07 DIAGNOSIS — E78.00 HIGH CHOLESTEROL: Primary | ICD-10-CM

## 2022-04-07 DIAGNOSIS — R07.9 CHEST PAIN, UNSPECIFIED TYPE: ICD-10-CM

## 2022-04-07 PROCEDURE — 3078F PR MOST RECENT DIASTOLIC BLOOD PRESSURE < 80 MM HG: ICD-10-PCS | Mod: CPTII,S$GLB,, | Performed by: INTERNAL MEDICINE

## 2022-04-07 PROCEDURE — 1160F RVW MEDS BY RX/DR IN RCRD: CPT | Mod: CPTII,S$GLB,, | Performed by: INTERNAL MEDICINE

## 2022-04-07 PROCEDURE — 3074F SYST BP LT 130 MM HG: CPT | Mod: CPTII,S$GLB,, | Performed by: INTERNAL MEDICINE

## 2022-04-07 PROCEDURE — 3008F PR BODY MASS INDEX (BMI) DOCUMENTED: ICD-10-PCS | Mod: CPTII,S$GLB,, | Performed by: INTERNAL MEDICINE

## 2022-04-07 PROCEDURE — 3074F PR MOST RECENT SYSTOLIC BLOOD PRESSURE < 130 MM HG: ICD-10-PCS | Mod: CPTII,S$GLB,, | Performed by: INTERNAL MEDICINE

## 2022-04-07 PROCEDURE — 3078F DIAST BP <80 MM HG: CPT | Mod: CPTII,S$GLB,, | Performed by: INTERNAL MEDICINE

## 2022-04-07 PROCEDURE — 3008F BODY MASS INDEX DOCD: CPT | Mod: CPTII,S$GLB,, | Performed by: INTERNAL MEDICINE

## 2022-04-07 PROCEDURE — 1160F PR REVIEW ALL MEDS BY PRESCRIBER/CLIN PHARMACIST DOCUMENTED: ICD-10-PCS | Mod: CPTII,S$GLB,, | Performed by: INTERNAL MEDICINE

## 2022-04-07 PROCEDURE — 99213 OFFICE O/P EST LOW 20 MIN: CPT | Mod: S$GLB,,, | Performed by: INTERNAL MEDICINE

## 2022-04-07 PROCEDURE — 99213 PR OFFICE/OUTPT VISIT, EST, LEVL III, 20-29 MIN: ICD-10-PCS | Mod: S$GLB,,, | Performed by: INTERNAL MEDICINE

## 2022-04-07 PROCEDURE — 1159F PR MEDICATION LIST DOCUMENTED IN MEDICAL RECORD: ICD-10-PCS | Mod: CPTII,S$GLB,, | Performed by: INTERNAL MEDICINE

## 2022-04-07 PROCEDURE — 1159F MED LIST DOCD IN RCRD: CPT | Mod: CPTII,S$GLB,, | Performed by: INTERNAL MEDICINE

## 2022-04-07 NOTE — PROGRESS NOTES
Subjective:       Patient ID: Dada Jean is a 51 y.o. male.    Chief Complaint: Chest Pain (Mild, intermit, over a week, tightness on the left side) and Follow-up    Chest Pain   This is a new problem. The current episode started 1 to 4 weeks ago. The problem occurs 2 to 4 times per day. The problem has been unchanged. The pain is mild. The quality of the pain is described as dull and tightness. The pain is aggravated by nothing.   Follow-up  Associated symptoms include chest pain.     Review of Systems   Constitutional: Negative.    Respiratory: Negative.    Cardiovascular: Positive for chest pain.         Objective:      Physical Exam  Vitals and nursing note reviewed.   Constitutional:       Appearance: He is well-developed.   HENT:      Head: Normocephalic and atraumatic.   Eyes:      Pupils: Pupils are equal, round, and reactive to light.   Cardiovascular:      Rate and Rhythm: Normal rate and regular rhythm.      Heart sounds: Normal heart sounds.   Pulmonary:      Effort: Pulmonary effort is normal.   Musculoskeletal:      Right knee: No swelling or erythema.        Legs:    Neurological:      Mental Status: He is alert.         Assessment:       Problem List Items Addressed This Visit        Unprioritized    High cholesterol - Primary      Other Visit Diagnoses     Chest pain, unspecified type        Relevant Orders    Stress Echo Which stress agent will be used? Treadmill Exercise; Color Flow Doppler? No          Plan:       Per orders and D/C instructions.     stress echo to evaluate chest pain.

## 2022-04-21 ENCOUNTER — CLINICAL SUPPORT (OUTPATIENT)
Dept: INTERNAL MEDICINE | Facility: CLINIC | Age: 52
End: 2022-04-21
Attending: INTERNAL MEDICINE
Payer: COMMERCIAL

## 2022-04-21 DIAGNOSIS — R07.9 CHEST PAIN, UNSPECIFIED TYPE: ICD-10-CM

## 2022-04-21 PROCEDURE — 93351 PR ECHO HEART XTHORACIC, STRESS/REST, W CONTIN ECG: ICD-10-PCS | Mod: S$GLB,,, | Performed by: INTERNAL MEDICINE

## 2022-04-21 PROCEDURE — 93351 STRESS TTE COMPLETE: CPT | Mod: S$GLB,,, | Performed by: INTERNAL MEDICINE

## 2022-06-30 ENCOUNTER — OFFICE VISIT (OUTPATIENT)
Dept: INTERNAL MEDICINE | Facility: CLINIC | Age: 52
End: 2022-06-30
Attending: INTERNAL MEDICINE
Payer: COMMERCIAL

## 2022-06-30 ENCOUNTER — LAB VISIT (OUTPATIENT)
Dept: LAB | Facility: OTHER | Age: 52
End: 2022-06-30
Attending: INTERNAL MEDICINE
Payer: COMMERCIAL

## 2022-06-30 ENCOUNTER — PATIENT MESSAGE (OUTPATIENT)
Dept: INTERNAL MEDICINE | Facility: CLINIC | Age: 52
End: 2022-06-30

## 2022-06-30 VITALS
WEIGHT: 249 LBS | BODY MASS INDEX: 34.86 KG/M2 | DIASTOLIC BLOOD PRESSURE: 72 MMHG | SYSTOLIC BLOOD PRESSURE: 122 MMHG | HEIGHT: 71 IN | OXYGEN SATURATION: 97 % | HEART RATE: 64 BPM

## 2022-06-30 DIAGNOSIS — E78.9 DISORDER OF LIPID METABOLISM: ICD-10-CM

## 2022-06-30 DIAGNOSIS — Z00.00 ROUTINE ADULT HEALTH MAINTENANCE: ICD-10-CM

## 2022-06-30 DIAGNOSIS — R79.89 OTHER SPECIFIED ABNORMAL FINDINGS OF BLOOD CHEMISTRY: ICD-10-CM

## 2022-06-30 DIAGNOSIS — Z12.5 SCREENING FOR PROSTATE CANCER: ICD-10-CM

## 2022-06-30 DIAGNOSIS — D51.0 PERNICIOUS ANEMIA: ICD-10-CM

## 2022-06-30 DIAGNOSIS — D50.8 OTHER IRON DEFICIENCY ANEMIA: ICD-10-CM

## 2022-06-30 DIAGNOSIS — Z00.00 ROUTINE ADULT HEALTH MAINTENANCE: Primary | ICD-10-CM

## 2022-06-30 DIAGNOSIS — E55.9 VITAMIN D DEFICIENCY: ICD-10-CM

## 2022-06-30 DIAGNOSIS — E78.00 HIGH CHOLESTEROL: Primary | ICD-10-CM

## 2022-06-30 DIAGNOSIS — E03.9 HYPOTHYROIDISM, UNSPECIFIED TYPE: ICD-10-CM

## 2022-06-30 LAB
25(OH)D3+25(OH)D2 SERPL-MCNC: 43 NG/ML (ref 30–96)
ALBUMIN SERPL BCP-MCNC: 4.1 G/DL (ref 3.5–5.2)
ALP SERPL-CCNC: 62 U/L (ref 55–135)
ALT SERPL W/O P-5'-P-CCNC: 15 U/L (ref 10–44)
ANION GAP SERPL CALC-SCNC: 9 MMOL/L (ref 8–16)
AST SERPL-CCNC: 19 U/L (ref 10–40)
BASOPHILS # BLD AUTO: 0.03 K/UL (ref 0–0.2)
BASOPHILS NFR BLD: 0.8 % (ref 0–1.9)
BILIRUB SERPL-MCNC: 0.5 MG/DL (ref 0.1–1)
BUN SERPL-MCNC: 14 MG/DL (ref 6–20)
CALCIUM SERPL-MCNC: 9.6 MG/DL (ref 8.7–10.5)
CHLORIDE SERPL-SCNC: 104 MMOL/L (ref 95–110)
CHOLEST SERPL-MCNC: 218 MG/DL (ref 120–199)
CHOLEST/HDLC SERPL: 3.5 {RATIO} (ref 2–5)
CO2 SERPL-SCNC: 28 MMOL/L (ref 23–29)
COMPLEXED PSA SERPL-MCNC: 0.16 NG/ML (ref 0–4)
CREAT SERPL-MCNC: 1.1 MG/DL (ref 0.5–1.4)
DIFFERENTIAL METHOD: ABNORMAL
EOSINOPHIL # BLD AUTO: 0.2 K/UL (ref 0–0.5)
EOSINOPHIL NFR BLD: 4.5 % (ref 0–8)
ERYTHROCYTE [DISTWIDTH] IN BLOOD BY AUTOMATED COUNT: 12.6 % (ref 11.5–14.5)
EST. GFR  (AFRICAN AMERICAN): >60 ML/MIN/1.73 M^2
EST. GFR  (NON AFRICAN AMERICAN): >60 ML/MIN/1.73 M^2
ESTIMATED AVG GLUCOSE: 100 MG/DL (ref 68–131)
GLUCOSE SERPL-MCNC: 85 MG/DL (ref 70–110)
HBA1C MFR BLD: 5.1 % (ref 4–5.6)
HCT VFR BLD AUTO: 42.7 % (ref 40–54)
HDLC SERPL-MCNC: 62 MG/DL (ref 40–75)
HDLC SERPL: 28.4 % (ref 20–50)
HGB BLD-MCNC: 14.7 G/DL (ref 14–18)
IMM GRANULOCYTES # BLD AUTO: 0 K/UL (ref 0–0.04)
IMM GRANULOCYTES NFR BLD AUTO: 0 % (ref 0–0.5)
LDLC SERPL CALC-MCNC: 141.2 MG/DL (ref 63–159)
LYMPHOCYTES # BLD AUTO: 1.3 K/UL (ref 1–4.8)
LYMPHOCYTES NFR BLD: 33.1 % (ref 18–48)
MCH RBC QN AUTO: 30.9 PG (ref 27–31)
MCHC RBC AUTO-ENTMCNC: 34.4 G/DL (ref 32–36)
MCV RBC AUTO: 90 FL (ref 82–98)
MONOCYTES # BLD AUTO: 0.3 K/UL (ref 0.3–1)
MONOCYTES NFR BLD: 9 % (ref 4–15)
NEUTROPHILS # BLD AUTO: 2 K/UL (ref 1.8–7.7)
NEUTROPHILS NFR BLD: 52.6 % (ref 38–73)
NONHDLC SERPL-MCNC: 156 MG/DL
NRBC BLD-RTO: 0 /100 WBC
PLATELET # BLD AUTO: 157 K/UL (ref 150–450)
PMV BLD AUTO: 10.9 FL (ref 9.2–12.9)
POTASSIUM SERPL-SCNC: 4.3 MMOL/L (ref 3.5–5.1)
PROT SERPL-MCNC: 7.4 G/DL (ref 6–8.4)
RBC # BLD AUTO: 4.76 M/UL (ref 4.6–6.2)
SODIUM SERPL-SCNC: 141 MMOL/L (ref 136–145)
TRIGL SERPL-MCNC: 74 MG/DL (ref 30–150)
TSH SERPL DL<=0.005 MIU/L-ACNC: 1.89 UIU/ML (ref 0.4–4)
VIT B12 SERPL-MCNC: 368 PG/ML (ref 210–950)
WBC # BLD AUTO: 3.78 K/UL (ref 3.9–12.7)

## 2022-06-30 PROCEDURE — 3008F BODY MASS INDEX DOCD: CPT | Mod: CPTII,S$GLB,, | Performed by: INTERNAL MEDICINE

## 2022-06-30 PROCEDURE — 84153 ASSAY OF PSA TOTAL: CPT | Performed by: INTERNAL MEDICINE

## 2022-06-30 PROCEDURE — 83036 HEMOGLOBIN GLYCOSYLATED A1C: CPT | Performed by: INTERNAL MEDICINE

## 2022-06-30 PROCEDURE — 82607 VITAMIN B-12: CPT | Performed by: INTERNAL MEDICINE

## 2022-06-30 PROCEDURE — 1160F PR REVIEW ALL MEDS BY PRESCRIBER/CLIN PHARMACIST DOCUMENTED: ICD-10-PCS | Mod: CPTII,S$GLB,, | Performed by: INTERNAL MEDICINE

## 2022-06-30 PROCEDURE — 1160F RVW MEDS BY RX/DR IN RCRD: CPT | Mod: CPTII,S$GLB,, | Performed by: INTERNAL MEDICINE

## 2022-06-30 PROCEDURE — 84443 ASSAY THYROID STIM HORMONE: CPT | Performed by: INTERNAL MEDICINE

## 2022-06-30 PROCEDURE — 3074F SYST BP LT 130 MM HG: CPT | Mod: CPTII,S$GLB,, | Performed by: INTERNAL MEDICINE

## 2022-06-30 PROCEDURE — 3078F DIAST BP <80 MM HG: CPT | Mod: CPTII,S$GLB,, | Performed by: INTERNAL MEDICINE

## 2022-06-30 PROCEDURE — 1159F PR MEDICATION LIST DOCUMENTED IN MEDICAL RECORD: ICD-10-PCS | Mod: CPTII,S$GLB,, | Performed by: INTERNAL MEDICINE

## 2022-06-30 PROCEDURE — 3008F PR BODY MASS INDEX (BMI) DOCUMENTED: ICD-10-PCS | Mod: CPTII,S$GLB,, | Performed by: INTERNAL MEDICINE

## 2022-06-30 PROCEDURE — 99396 PR PREVENTIVE VISIT,EST,40-64: ICD-10-PCS | Mod: S$GLB,,, | Performed by: INTERNAL MEDICINE

## 2022-06-30 PROCEDURE — 80061 LIPID PANEL: CPT | Performed by: INTERNAL MEDICINE

## 2022-06-30 PROCEDURE — 1159F MED LIST DOCD IN RCRD: CPT | Mod: CPTII,S$GLB,, | Performed by: INTERNAL MEDICINE

## 2022-06-30 PROCEDURE — 82306 VITAMIN D 25 HYDROXY: CPT | Performed by: INTERNAL MEDICINE

## 2022-06-30 PROCEDURE — 3078F PR MOST RECENT DIASTOLIC BLOOD PRESSURE < 80 MM HG: ICD-10-PCS | Mod: CPTII,S$GLB,, | Performed by: INTERNAL MEDICINE

## 2022-06-30 PROCEDURE — 36415 COLL VENOUS BLD VENIPUNCTURE: CPT | Performed by: INTERNAL MEDICINE

## 2022-06-30 PROCEDURE — 3074F PR MOST RECENT SYSTOLIC BLOOD PRESSURE < 130 MM HG: ICD-10-PCS | Mod: CPTII,S$GLB,, | Performed by: INTERNAL MEDICINE

## 2022-06-30 PROCEDURE — 80053 COMPREHEN METABOLIC PANEL: CPT | Performed by: INTERNAL MEDICINE

## 2022-06-30 PROCEDURE — 85025 COMPLETE CBC W/AUTO DIFF WBC: CPT | Performed by: INTERNAL MEDICINE

## 2022-06-30 PROCEDURE — 99396 PREV VISIT EST AGE 40-64: CPT | Mod: S$GLB,,, | Performed by: INTERNAL MEDICINE

## 2022-06-30 NOTE — PROGRESS NOTES
Subjective:       Patient ID: Dada Jean is a 51 y.o. male.    Chief Complaint: No chief complaint on file.      Adult Wellness Exam:    Mental Conditions: None  Depression Risk Factors: None  BMI: See Vital signs   Colon screen:    See Health Maintenance Report                                       Vaccines (Flu, Adacel, Shingrix): See Health Maintenance Report  Routine labs (Cholesterol, Glucose/Hgb A1C, and TSH): ordered.     The patient's current health status is: Good   Patient was educated on all medical problems and routine health maintenance. See Patient Instructions.                               Review of Systems   Constitutional: Negative.    Respiratory: Negative.    Cardiovascular: Negative.          Objective:      Physical Exam  Vitals and nursing note reviewed.   Constitutional:       Appearance: He is well-developed.   HENT:      Head: Normocephalic and atraumatic.   Eyes:      Pupils: Pupils are equal, round, and reactive to light.   Cardiovascular:      Rate and Rhythm: Normal rate and regular rhythm.      Heart sounds: Normal heart sounds.   Pulmonary:      Effort: Pulmonary effort is normal.   Musculoskeletal:      Right knee: No swelling or erythema.        Legs:    Neurological:      Mental Status: He is alert.         Assessment:       Problem List Items Addressed This Visit        Unprioritized    High cholesterol - Primary      Other Visit Diagnoses     Routine adult health maintenance              Plan:       Per orders and D/C instructions.     continue a low-fat diet for high cholesterol.  I encouraged him to follow a low-sodium diet, exercise, and try to lose weight since his BP got very high during his stress test.  Check routine labs.

## 2023-03-27 ENCOUNTER — OFFICE VISIT (OUTPATIENT)
Dept: URGENT CARE | Facility: CLINIC | Age: 53
End: 2023-03-27
Payer: OTHER MISCELLANEOUS

## 2023-03-27 VITALS
BODY MASS INDEX: 30.52 KG/M2 | HEART RATE: 63 BPM | SYSTOLIC BLOOD PRESSURE: 125 MMHG | TEMPERATURE: 98 F | DIASTOLIC BLOOD PRESSURE: 82 MMHG | HEIGHT: 71 IN | OXYGEN SATURATION: 95 % | WEIGHT: 218 LBS

## 2023-03-27 DIAGNOSIS — Z02.6 ENCOUNTER RELATED TO WORKER'S COMPENSATION CLAIM: ICD-10-CM

## 2023-03-27 DIAGNOSIS — S49.91XA INJURY OF RIGHT SHOULDER, INITIAL ENCOUNTER: ICD-10-CM

## 2023-03-27 DIAGNOSIS — S46.911A STRAIN OF RIGHT SHOULDER, INITIAL ENCOUNTER: Primary | ICD-10-CM

## 2023-03-27 PROCEDURE — 99203 OFFICE O/P NEW LOW 30 MIN: CPT | Mod: 25,S$GLB,, | Performed by: PHYSICIAN ASSISTANT

## 2023-03-27 PROCEDURE — 73030 X-RAY EXAM OF SHOULDER: CPT | Mod: RT,S$GLB,, | Performed by: RADIOLOGY

## 2023-03-27 PROCEDURE — 73030 XR SHOULDER COMPLETE 2 OR MORE VIEWS RIGHT: ICD-10-PCS | Mod: RT,S$GLB,, | Performed by: RADIOLOGY

## 2023-03-27 PROCEDURE — 99203 PR OFFICE/OUTPT VISIT, NEW, LEVL III, 30-44 MIN: ICD-10-PCS | Mod: 25,S$GLB,, | Performed by: PHYSICIAN ASSISTANT

## 2023-03-27 PROCEDURE — 20610 DRAIN/INJ JOINT/BURSA W/O US: CPT | Mod: RT,S$GLB,, | Performed by: PHYSICIAN ASSISTANT

## 2023-03-27 PROCEDURE — 20610 LARGE JOINT ASPIRATION/INJECTION: R GLENOHUMERAL: ICD-10-PCS | Mod: RT,S$GLB,, | Performed by: PHYSICIAN ASSISTANT

## 2023-03-27 RX ORDER — DICLOFENAC SODIUM 10 MG/G
2 GEL TOPICAL 3 TIMES DAILY
Qty: 100 G | Refills: 1 | Status: SHIPPED | OUTPATIENT
Start: 2023-03-27 | End: 2023-04-06

## 2023-03-27 RX ORDER — BUPIVACAINE HYDROCHLORIDE 5 MG/ML
4 INJECTION, SOLUTION PERINEURAL
Status: DISCONTINUED | OUTPATIENT
Start: 2023-03-27 | End: 2023-03-27 | Stop reason: HOSPADM

## 2023-03-27 RX ORDER — BETAMETHASONE SODIUM PHOSPHATE AND BETAMETHASONE ACETATE 3; 3 MG/ML; MG/ML
6 INJECTION, SUSPENSION INTRA-ARTICULAR; INTRALESIONAL; INTRAMUSCULAR; SOFT TISSUE
Status: DISCONTINUED | OUTPATIENT
Start: 2023-03-27 | End: 2023-03-27 | Stop reason: HOSPADM

## 2023-03-27 RX ORDER — LIDOCAINE HYDROCHLORIDE 10 MG/ML
4 INJECTION, SOLUTION EPIDURAL; INFILTRATION; INTRACAUDAL; PERINEURAL
Status: DISCONTINUED | OUTPATIENT
Start: 2023-03-27 | End: 2023-03-27 | Stop reason: HOSPADM

## 2023-03-27 RX ADMIN — BETAMETHASONE SODIUM PHOSPHATE AND BETAMETHASONE ACETATE 6 MG: 3; 3 INJECTION, SUSPENSION INTRA-ARTICULAR; INTRALESIONAL; INTRAMUSCULAR; SOFT TISSUE at 09:03

## 2023-03-27 RX ADMIN — BUPIVACAINE HYDROCHLORIDE 4 ML: 5 INJECTION, SOLUTION PERINEURAL at 09:03

## 2023-03-27 RX ADMIN — LIDOCAINE HYDROCHLORIDE 4 ML: 10 INJECTION, SOLUTION EPIDURAL; INFILTRATION; INTRACAUDAL; PERINEURAL at 09:03

## 2023-03-27 NOTE — LETTER
Urgent Care - [address] [address]  Phone: [phone]  Fax: [phone]  [facility]: [id number]    Pt Name: Dada Jean  Injury Date: 03/22/2023   Employee ID: [id number] Date of First Treatment: 03/27/2023   Company:[employer]      Appointment Time: 09:05 AM Arrived:9:15AM   Provider: Brad Hager PA-C Time Out:11:30AM     Office Treatment:   1. Strain of right shoulder, initial encounter    2. Encounter related to worker's compensation claim    3. Injury of right shoulder, initial encounter      Medications Ordered This Encounter   Medications    diclofenac sodium (VOLTAREN) 1 % Gel      Patient Instructions: Daily home exercises/warm soaks    Restrictions: Regular Duty     Return Appointment: 4/4/2023 at 8:25AM    KW          No
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
## 2023-04-04 ENCOUNTER — OFFICE VISIT (OUTPATIENT)
Dept: URGENT CARE | Facility: CLINIC | Age: 53
End: 2023-04-04
Payer: OTHER MISCELLANEOUS

## 2023-04-04 VITALS
OXYGEN SATURATION: 97 % | HEART RATE: 64 BPM | SYSTOLIC BLOOD PRESSURE: 121 MMHG | TEMPERATURE: 98 F | DIASTOLIC BLOOD PRESSURE: 79 MMHG

## 2023-04-04 DIAGNOSIS — Z02.6 ENCOUNTER RELATED TO WORKER'S COMPENSATION CLAIM: ICD-10-CM

## 2023-04-04 DIAGNOSIS — S46.911D STRAIN OF RIGHT SHOULDER, SUBSEQUENT ENCOUNTER: Primary | ICD-10-CM

## 2023-04-04 PROCEDURE — 99213 OFFICE O/P EST LOW 20 MIN: CPT | Mod: S$GLB,,, | Performed by: PHYSICIAN ASSISTANT

## 2023-04-04 PROCEDURE — 99213 PR OFFICE/OUTPT VISIT, EST, LEVL III, 20-29 MIN: ICD-10-PCS | Mod: S$GLB,,, | Performed by: PHYSICIAN ASSISTANT

## 2023-04-04 NOTE — PROGRESS NOTES
Subjective:      Patient ID: Dada Jean is a 52 y.o. male.    Chief Complaint: Shoulder Injury (RIGHT SHOULDER)    KW  Patient's place of employment - [employer]  Patient's job title -   Date of Injury - 03/22/2023  Body part injured - RIGHT SHOULDER  Current work status per last visit - LIGHT  Improved, same, or worse - SAME  Pain Scale right now (1-10) -  5    Since for follow-up right shoulder injury.  He reports slight improvement since last office visit.  Reports pain in the anterolateral shoulder, worse with activity and laying on it.  Patient has been using diclofenac gel once daily.  Patient received a intra-articular steroid injection last week with minimal improvement. MEB    Shoulder Injury   Pertinent negatives include no numbness.   Constitution: Positive for activity change.   Musculoskeletal:  Positive for pain, joint pain and abnormal ROM of joint.   Skin:  Negative for bruising.   Neurological:  Negative for numbness and tingling.   Objective:     Physical Exam  Vitals and nursing note reviewed.   Constitutional:       General: He is not in acute distress.     Appearance: He is well-developed. He is not diaphoretic.   HENT:      Head: Normocephalic and atraumatic.      Right Ear: Hearing and external ear normal.      Left Ear: Hearing and external ear normal.      Nose: Nose normal. No nasal deformity.   Eyes:      General: Lids are normal. No scleral icterus.     Conjunctiva/sclera: Conjunctivae normal.   Neck:      Trachea: Trachea normal.   Cardiovascular:      Pulses: Normal pulses.   Pulmonary:      Effort: Pulmonary effort is normal. No respiratory distress.      Breath sounds: No stridor.   Musculoskeletal:      Right shoulder: Tenderness present. No swelling, deformity or crepitus. Decreased range of motion. Normal strength. Normal pulse.        Arms:       Cervical back: Normal range of motion.      Comments: Right shoulder:  AROM flexion 180 degrees, abduction 180 degrees, ER 90  degrees, IR L3 (L T5).  O'Manny negative  Pattie Simpson positive   Skin:     General: Skin is warm and dry.      Capillary Refill: Capillary refill takes less than 2 seconds.   Neurological:      Mental Status: He is alert. He is not disoriented.      GCS: GCS eye subscore is 4. GCS verbal subscore is 5. GCS motor subscore is 6.      Sensory: No sensory deficit.   Psychiatric:         Attention and Perception: He is attentive.         Speech: Speech normal.         Behavior: Behavior normal.      Assessment:      1. Strain of right shoulder, subsequent encounter    2. Encounter related to worker's compensation claim      Plan:     Patient showing signs of possible rotator cuff injury.  Patient to continue diclofenac gel, Tylenol as needed.  I will order physical therapy.  We will see patient back in 3 weeks to reassess.       Patient Instructions: Attention not to aggravate affected area, Daily home exercises/warm soaks, PT to be scheduled once authorized   Restrictions: Regular Duty  Follow up in about 3 weeks (around 4/25/2023).

## 2023-04-04 NOTE — LETTER
Urgent Care - [address] [address]  Phone: [phone]  Fax: [phone]  [facility]: [id number]    Pt Name: Dada Jean  Injury Date: 03/22/2023   Employee ID: [id number] Date of Treatment: 04/04/2023   Company: [employer]      Appointment Time: 08:10 AM Arrived: 8:00AM   Provider: Brad Hager PA-C Time Out:8:50AM     Office Treatment:   1. Strain of right shoulder, subsequent encounter    2. Encounter related to worker's compensation claim          Patient Instructions: Attention not to aggravate affected area, Daily home exercises/warm soaks, PT to be scheduled once authorized    Restrictions: Regular Duty     Return Appointment: 4/25/2023 at 8:25AM    KW

## 2023-04-14 ENCOUNTER — CLINICAL SUPPORT (OUTPATIENT)
Dept: REHABILITATION | Facility: HOSPITAL | Age: 53
End: 2023-04-14
Payer: OTHER MISCELLANEOUS

## 2023-04-14 DIAGNOSIS — M25.611 DECREASED RANGE OF MOTION OF RIGHT SHOULDER: ICD-10-CM

## 2023-04-14 PROCEDURE — 97110 THERAPEUTIC EXERCISES: CPT | Mod: PN

## 2023-04-14 PROCEDURE — 97162 PT EVAL MOD COMPLEX 30 MIN: CPT | Mod: PN

## 2023-04-14 NOTE — PROGRESS NOTES
[facility]  Physical Therapy Initial Evaluation - Shoulder    Name: Dada Jean  Clinic Number: [id number]    Therapy Diagnosis: No diagnosis found.  Physician: Brad Hager, [place]    Physician Orders: PT Eval and Treat   Medical Diagnosis from Referral: S46.911D (ICD-10-CM) - Strain of right shoulder, subsequent encounter  Evaluation Date: 4/14/2023  Plan of Care Expiration: 4/3/24 or 9th Visit  Authorization Period Expiration: 4/3/24  Visit # / Visits authorized: 1/ 9  Remaining Visits Scheduled - 00    Eval Visit FOTO-  (Date/Score/Turn Green)   5th Visit FOTO   -  (Date/Score/Turn Green)   10th Visit FOTO  -  (Date/Score/Turn Green)   D/C FOTO          -  (Date/Score/Turn Green)     Please see Plan of Care notation section to view Dada Jean Initial Evaluation.       Damien Santiago, PT,DPT

## 2023-04-17 PROBLEM — M25.611 DECREASED RANGE OF MOTION OF RIGHT SHOULDER: Status: ACTIVE | Noted: 2023-04-17

## 2023-04-17 NOTE — PLAN OF CARE
[facility]  Physical Therapy Initial Evaluation - Shoulder    Name: Dada Jean  Clinic Number: [id number]    Therapy Diagnosis: No diagnosis found.  Physician: Brad Hager PA-C    Physician Orders: PT Eval and Treat   Medical Diagnosis from Referral: S46.911D (ICD-10-CM) - Strain of right shoulder, subsequent encounter  Evaluation Date: 4/14/2023  Plan of Care Expiration: 4/3/24 or 9th Visit  Authorization Period Expiration: 4/3/24  Visit # / Visits authorized: 1/ 9  Remaining Visits Scheduled - 00    Eval Visit FOTO-  (Date/Score/Turn Green)   5th Visit FOTO   -  (Date/Score/Turn Green)   10th Visit FOTO  -  (Date/Score/Turn Green)   D/C FOTO          -  (Date/Score/Turn Green)     Time In: 1230  Time Out: 1345  Total Billable Time: 45 minutes    Precautions: Standard    Subjective     History of current condition - Dada is a 52 y.o. year old male who presents to the [facility] PT clinic  with complaint of right shoulder pain. Patient reports working and experiencing an increase in right shoulder pain (sharp pain) while reaching over his head. Patient reports the inability. Patient reports a constant pain to the anterior/lateral aspect of the right shoulder with pain that shoots down the shoulder/deltoid . Pt report onset of the symptoms occurred 3 weeks  prior to evaluation. Precipitating event:Insidious Onset . Current symptoms include: right shoulder pain . Aggravating factors: AROM .  Treatment to date: none. Patients presently rates pain 6/10 on pain scale.    Mechanism of Injury: pain with reaching   Next MD Visit: TBD     Medical History:   Past Medical History:   Diagnosis Date    AR (allergic rhinitis) 3/6/2015    History of echocardiogram 10/17/2012    History of gastric restrictive surgery 3/16/2017    Gastric sleeve 9/16    HTN (hypertension) 3/6/2015    2015    Vitamin D deficiency 3/8/2015       Surgical History:   Dada Jean  has a past surgical history that  "includes Gastric sleeve (09/2016) and Nasal sinus surgery (Bilateral, 06/2016).    Medications:   Dada has a current medication list which includes the following prescription(s): cholecalciferol (vitamin d3), diclofenac sodium, escitalopram oxalate, and multivit-min/ferrous fumarate.    Allergies:   Review of patient's allergies indicates:   Allergen Reactions    Pcn [penicillins] Rash        Imaging:X-ray   Per radiology report "Mild DJD.  No fracture or dislocation.  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)  Assistive Devices Owned: none   Hobbies/Exercise: none  Hand Dominance: right  Prior Level of Function: Independent  Current Level of Function: Modified Independent secondary to right shoulder pain     Pain:  Current 6/10, worst 9/10 (reaching increases pain), best 6/10 (none reduces pain)  Location: right shoulder  Description: Burning, Sharp, and Shooting  Aggravating Factors: reaching   Easing Factors: rest    Pts goals: reaching without pain     Objective     Posture: Fair   Sensation: intact to light touch      Range of Motion/Strength:     Shoulder Right Pain/Dysfunction with Movement   AROM     Flexion (180)  120°     Extension (60)  60°     Abduction (180)  50°     Adduction (50)  50°     Internal rotation (70)  60°     ER (70) [at 45° shoulder abduction]  20°         Shoulder Right Pain/Dysfunction with Movement   PROM     Flexion (180)  160°     Extension(60)  40°     Abduction (180)  100°     Adduction (50)  30°     Internal rotation (70)  50°     ER (70) [at 45° shoulder abduction]  45°       Left UE  5/5 4+/5 4/5 4-/5 3+/5 3/5 3-/5 2+/5 2/5 2-/5 1/5 0 NT   Shoulder  Flexion  x                   Shoulder Extension  x                   Shoulder Abduction  " x                   Shoulder Adduction  x                   Shoulder  IR  x                   Shoulder  ER  x                   Elbow Extension   x                   Elbow Flexion   x                   Rhomboids   x                   Upper Trap  x                   Mid Traps  x              Lower Traps  x              Serratus Anterior  x                Right UE 5/5 4+/5 4/5 4-/5 3+/5 3/5 3-/5 2+/5 2/5 2-/5 1/5 0 NT   Shoulder  Flexion     x           Shoulder Extension     x           Shoulder Abduction     x           Shoulder Adduction     x           Shoulder  IR     x           Shoulder  ER     x           Elbow Extension     x            Elbow Flexion     x            Rhomboids     x            Upper Trap    x            Mid Traps    x            Lower Traps    x            Serratus Anterior    x                Special Tests Left Right Comments   Neer Impingement  (RC impingement) negative negative    Pattie & Bridger (RC impingement -sup/minor/infra) negative negative           Strength  NT # NT #        Other:       CMS Impairment/Limitation/Restriction for FOTO Shoulder Survey    Therapist reviewed FOTO scores for Dada Jean on 4/14/2023.   FOTO documents entered into [employer] - see Media section.    Limitation Score: See Media Section   Category: Mobility         TREATMENT   Treatment Time In: 1305  Treatment Time Out: 1315  Total Treatment time separate from Evaluation: 10 minutes    Dada received therapeutic exercises to develop strength, ROM, and flexibility for 10 minutes including:    Shrugs - 30 repititions    Scapular retractions - 30 repititions    passive range of motion flexion in standing - X2'   Cervical rotation, lateral flexion and flex/ext - 30 repititions    Table slides flexion, abduction , scaption - 30 repititions       Home Exercises and Patient Education Provided    Education provided:   Patient was provided educational information regarding: role of Physical Therapy, short and  long term goals, patient/therapist expectations, scheduling, and attendance policy.    Written Home Exercises Provided: yes  Exercises were reviewed and Dada was able to demonstrate them prior to the end of the session.  Dada demonstrated fair  understanding of the education provided.     See EMR under: Patient Instructions for exercises provided 4/14/2023.    Assessment   Dada is a 52 y.o. male referred to outpatient Physical Therapy with a medical diagnosis of Strain of right shoulder, subsequent encounter. Pt presents with displays of weakness, impaired functional mobility, decreased upper extremity function, pain, and decreased ROM. Patient currently presents to therapy with displays of decreased pain right shoulder range of motion. Patient attributes pain with forceful overhead  movement during work related duties. Patient displays pain with shoulder flexion and shoulder above 90 degrees. Patient will benefit from skilled therapy to address current deficits.     Pt prognosis is Good.   Pt will benefit from skilled outpatient Physical Therapy to address the deficits stated above and in the chart below, provide pt/family education, and to maximize pt's level of independence.     Plan of care discussed with patient: Yes  Pt's spiritual, cultural and educational needs considered and patient is agreeable to the plan of care and goals as stated below:     Anticipated Barriers for therapy: None Identified during initial evaluation     Medical Necessity is demonstrated by the following  History  Co-morbidities and personal factors that may impact the plan of care Co-morbidities:     Past Medical History:   Diagnosis Date    AR (allergic rhinitis) 3/6/2015    History of echocardiogram 10/17/2012    History of gastric restrictive surgery 3/16/2017    Gastric sleeve 9/16    HTN (hypertension) 3/6/2015    2015    Vitamin D deficiency 3/8/2015       Personal Factors:   no deficits     moderate   Examination  Body  Structures and Functions, activity limitations and participation restrictions that may impact the plan of care Body Regions:   upper extremities    Body Systems:    ROM  strength    Participation Restrictions:   none    Activity limitations:   Learning and applying knowledge  no deficits    General Tasks and Commands  no deficits    Communication  communicating with/receiving spoken language    Mobility  lifting and carrying objects    Self care  no deficits    Domestic Life  doing house work (cleaning house, washing dishes, laundry)    Interactions/Relationships  no deficits    Life Areas  no deficits    Community and Social Life  no deficits         moderate   Clinical Presentation evolving clinical presentation with changing clinical characteristics moderate   Decision Making/ Complexity Score: moderate     Goals:  Short Term Goals: 1 weeks      Goal   Status   Pt. to report decreased right shoulder pain </= 6/10 at worst to increase tolerance for performing overhead reaching     Pt. to demonstrate proper cervical and scapula retraction requiring minimum verbal cues from PT to perform    Pt. to demonstrate an increase in right shoulder elevation AROM to >100 deg. to promote greater ease with lifting tasks.     Pt. to demonstrate an increase in right shoulder internal and external AROM to +5 deg. to promote greater ease self care skills     Pt to tolerate HEP to improve ROM and independence with ADL's         Long Term Goals: 3 weeks    Goal Status    Pt. to report decreased right shoulder pain </= 2/10 at worst to increase tolerance for performing overhead reaching     Pt. to demonstrate proper cervical and scapula retraction requiring minimum verbal cues from PT to perform    Pt. to demonstrate an increase in right shoulder elevation AROM to >15 deg. to promote greater ease with lifting tasks.     Pt. to demonstrate an increase in right shoulder internal and external AROM to +10 deg. to promote greater ease self  care skills     Pt. to demonstrate increased MMT for right shoulder flexion to 4/5 to improve ADL tolerance      Pt. to demonstrate increased MMT for mid-trap/lower trap strength to 4/5 to improve scapula stability during ADL and home related chores.     Pt. to demonstrate increased MMT to serratus anterior to 4/5 to improve scapula stability during repetitive UE tasks     Pt to tolerate HEP to improve ROM and independence with ADL's         Plan     Plan of care Certification: 4/14/2023 to 5/12/2023     Outpatient Physical Therapy 3 times weekly for 3 weeks to include the following interventions: Manual Therapy, Neuromuscular Re-ed, Patient Education, Therapeutic Activities, Therapeutic Exercise, Ultrasound, and Integrative Dry Needling .     Damien Santiago, PT,DPT

## 2023-04-19 ENCOUNTER — CLINICAL SUPPORT (OUTPATIENT)
Dept: REHABILITATION | Facility: HOSPITAL | Age: 53
End: 2023-04-19
Payer: COMMERCIAL

## 2023-04-19 DIAGNOSIS — M25.611 DECREASED RANGE OF MOTION OF RIGHT SHOULDER: Primary | ICD-10-CM

## 2023-04-19 PROCEDURE — 97140 MANUAL THERAPY 1/> REGIONS: CPT | Mod: PN

## 2023-04-19 PROCEDURE — 97110 THERAPEUTIC EXERCISES: CPT | Mod: PN

## 2023-04-19 NOTE — PROGRESS NOTES
Physical Therapy Daily Treatment Note     Name: Dada Jean  [facility] Number: [id number]    Therapy Diagnosis:   Encounter Diagnosis   Name Primary?    Decreased range of motion of right shoulder Yes     Physician: No ref. provider found    Visit Date: 2023    Physician Orders: PT Eval and Treat   Medical Diagnosis from Referral: S46.911D (ICD-10-CM) - Strain of right shoulder, subsequent encounter  Evaluation Date: 2023  Plan of Care Expiration: 4/3/24 or 9th Visit  Authorization Period Expiration: 4/3/24  Visit # / Visits authorized:   Remaining Visits Scheduled - 00    Eval Visit FOTO-  (Date/Score/Turn Green)   5th Visit FOTO   -  (Date/Score/Turn Green)   10th Visit FOTO  -  (Date/Score/Turn Green)   D/C FOTO          -  (Date/Score/Turn Green)    Time In: 330  Time Out: 415pm  Billable Time: 45 minutes  Non-Billable Time: 00 minutes    Precautions: Standard  Insurance: Payor: GENERIC WORKERS COMP / Plan: GENERIC WORKER'S COMP / Product Type: Worker's Comp /     Subjective     Pt reports: improve range of motion however continued pain .  He is compliant with home exercise program.  Response to previous treatment: 1st treatment session     Pre-Treatment Pain Ratin/10  Post-Treatment Pain Ratin/10  Location: right shoulder/elbow      Objective     Dada received therapeutic exercises to develop strength, ROM, and flexibility for 20 minutes including:    Warm-up      Supine    Cane flexion close and wide  - 30 repititions    Wall slides - 30 repititions   ULTT for median nerve     Seated    Scapular  retratction - 30 repititions   Scapular elevation - 30 repititions    Upper trapezius stretch - 1.5'    Standing              Dada received the following manual therapy techniques: Joint mobilizations and Soft tissue Mobilization were applied to the: right shoulder for 25 minutes, including:    Visit Number:  2 out of 9   Manual Therapy  (amplitude and time)     Inferior/posterior joint  mobs  Done                        Dry Needling Performed by Damien Santiago DPT, Cert. DN: The patient was cleared of all contraindications and educated on the risks and effects of dry needling, and post needling expectations. The patient was agreeable to dry needling treatment. Pt signed consent form pre needling. Dry Needling was performed using 0.30 x 40 mm needles to biceps brachii from anterior to posterior with electrical stimulation applied at 2 Hz for 10 minutes. No adverse affects were noted. Performed no periosteal pecking and bidirectional twisting prior to stim.         Home Exercises Provided and Patient Education Provided     Education provided:   - Continue with HEP    Written Home Exercises Provided: Patient instructed to cont prior HEP.  Exercises were reviewed and Dada was able to demonstrate them prior to the end of the session.  Dada demonstrated fair  understanding of the education provided.     See EMR under Patient Instructions for exercises provided prior visit.    Assessment     Pt currently reports displaying continued limited mobility of the shoulder with pain.  Pt required an increase in verbal and tactile cueing during today's treatment session.  Pt tolerated treatment session fair  and will continue to benefit from skilled therapy to address deficits.  Pt performed today's therapeutic interventions without adverse events.  Pt educated to continue HEP to improve progression.     Dada Is progressing well towards His goals.     Pt prognosis is Good.     Pt will continue to benefit from skilled outpatient physical therapy to address the deficits listed in the problem list box on initial evaluation, provide pt/family education and to maximize pt's level of independence in the home and community environment.     Pt's spiritual, cultural and educational needs considered and pt agreeable to plan of care and goals.    Anticipated barriers to physical therapy: continuous overhead movement      Goals: Short Term Goals: 1 weeks        Goal   Status   Pt. to report decreased right shoulder pain </= 6/10 at worst to increase tolerance for performing overhead reaching      Pt. to demonstrate proper cervical and scapula retraction requiring minimum verbal cues from PT to perform     Pt. to demonstrate an increase in right shoulder elevation AROM to >100 deg. to promote greater ease with lifting tasks.      Pt. to demonstrate an increase in right shoulder internal and external AROM to +5 deg. to promote greater ease self care skills      Pt to tolerate HEP to improve ROM and independence with ADL's            Long Term Goals: 3 weeks     Goal Status    Pt. to report decreased right shoulder pain </= 2/10 at worst to increase tolerance for performing overhead reaching      Pt. to demonstrate proper cervical and scapula retraction requiring minimum verbal cues from PT to perform     Pt. to demonstrate an increase in right shoulder elevation AROM to >15 deg. to promote greater ease with lifting tasks.      Pt. to demonstrate an increase in right shoulder internal and external AROM to +10 deg. to promote greater ease self care skills      Pt. to demonstrate increased MMT for right shoulder flexion to 4/5 to improve ADL tolerance       Pt. to demonstrate increased MMT for mid-trap/lower trap strength to 4/5 to improve scapula stability during ADL and home related chores.      Pt. to demonstrate increased MMT to serratus anterior to 4/5 to improve scapula stability during repetitive UE tasks      Pt to tolerate HEP to improve ROM and independence with ADL's           Plan     Continued with current POC      Damien Santiago, PT ,DPT  4/25/2023

## 2023-04-20 ENCOUNTER — CLINICAL SUPPORT (OUTPATIENT)
Dept: REHABILITATION | Facility: HOSPITAL | Age: 53
End: 2023-04-20
Payer: COMMERCIAL

## 2023-04-20 DIAGNOSIS — M25.611 DECREASED RANGE OF MOTION OF RIGHT SHOULDER: Primary | ICD-10-CM

## 2023-04-20 PROCEDURE — 97112 NEUROMUSCULAR REEDUCATION: CPT | Mod: PN,CQ

## 2023-04-20 PROCEDURE — 97110 THERAPEUTIC EXERCISES: CPT | Mod: PN,CQ

## 2023-04-20 NOTE — PROGRESS NOTES
Physical Therapy Daily Treatment Note     Name: Dada Jean  [facility] Number: [id number]    Therapy Diagnosis:   Encounter Diagnosis   Name Primary?    Decreased range of motion of right shoulder Yes     Physician: Brad Hager PA-C    Visit Date: 2023    Physician Orders: PT Eval and Treat   Medical Diagnosis from Referral: S46.911D (ICD-10-CM) - Strain of right shoulder, subsequent encounter  Evaluation Date: 2023  Plan of Care Expiration: 4/3/24 or 9 Visit  Authorization Period Expiration: 4/3/24  Visit # / Visits authorized:   Remaining Visits Scheduled -     Eval Visit FOTO-  (Date/Score/Turn Green)   5th Visit FOTO   -  (Date/Score/Turn Green)   10th Visit FOTO  -  (Date/Score/Turn Green)   D/C FOTO          -  (Date/Score/Turn Green)    Time In: 11:54  Time Out: 12:32 pm  Billable Time: 38 minutes  Non-Billable Time: 00 minutes    Precautions: Standard  Insurance: Payor: GENERIC WORKERS COMP / Plan: GENERIC WORKER'S COMP / Product Type: Worker's Comp /     Subjective     Pt reports: Pain with abduction  He is compliant with home exercise program.  Response to previous treatment: shoulder feels the same    Pre-Treatment Pain Ratin/10  Post-Treatment Pain Ratin/10  Location: right shoulder/elbow      Objective     Dada received therapeutic exercises to develop strength, ROM, and flexibility for 25 minutes including:      Shrugs - 30 repititions    Scapular retractions - 30 repetitions    passive range of motion flexion in standing - X2'   Cervical rotation, lateral flexion and flex/ext - 30 repititions    Table slides flexion, abduction , scaption - 30 repititions    Supine Dowel flexion 2x10   Supine dowel abduction 2x10  Supine ER stretch 2x10 5s hold  Prayer stretch 3x30s    Dada participated in neuromuscular re-education activities to improve: Coordination, Kinesthetic, and Proprioception for 13 minutes. The following activities were included:  Isometric  ER/IR/flexion/abduction 2x10      *Bold exercise performed       Dada received the following manual therapy techniques: Joint mobilizations and Soft tissue Mobilization were applied to the: right shoulder for 00 minutes, including:    Visit Number:  2 out of 9   Manual Therapy  (amplitude and time)     IASTM to tricep NP   PROM of shoulder all planes NP                   Dry Needling Performed by Damien Santiago DPT, Cert. DN: The patient was cleared of all contraindications and educated on the risks and effects of dry needling, and post needling expectations. The patient was agreeable to dry needling treatment. Pt signed consent form pre needling. Dry Needling was performed using 0.30 x 40 mm needles to biceps brachii from anterior to posterior with electrical stimulation applied at 2 Hz for 10 minutes. No adverse affects were noted. Performed no periosteal pecking and bidirectional twisting prior to stim. - NP today        Home Exercises Provided and Patient Education Provided     Education provided:   - Continue with HEP    Written Home Exercises Provided: Patient instructed to cont prior HEP.  Exercises were reviewed and Dada was able to demonstrate them prior to the end of the session.  Dada demonstrated fair  understanding of the education provided.     See EMR under Patient Instructions for exercises provided prior visit.    Assessment       Pt tolerated session well. Isometric exercises added to improve neuromuscular control of rotator cuff musculature, Internal rotation was most difficult. Supine shoulder abduction challenging due to pain, verbal cues given to stay in pain free range during exercise. Dada had no adverse effects with exercise today and will continue to benefit from skilled therapy.     Dada Is progressing well towards His goals.     Pt prognosis is Good.     Pt will continue to benefit from skilled outpatient physical therapy to address the deficits listed in the problem list box on  initial evaluation, provide pt/family education and to maximize pt's level of independence in the home and community environment.     Pt's spiritual, cultural and educational needs considered and pt agreeable to plan of care and goals.    Anticipated barriers to physical therapy: None identified at eval    Goals:  Short Term Goals: 1 weeks        Goal   Status   Pt. to report decreased right shoulder pain </= 6/10 at worst to increase tolerance for performing overhead reaching   Progressing 4/20/2023     Pt. to demonstrate proper cervical and scapula retraction requiring minimum verbal cues from PT to perform Progressing 4/20/2023      Pt. to demonstrate an increase in right shoulder elevation AROM to >100 deg. to promote greater ease with lifting tasks.  Progressing 4/20/2023      Pt. to demonstrate an increase in right shoulder internal and external AROM to +5 deg. to promote greater ease self care skills  Progressing 4/20/2023      Pt to tolerate HEP to improve ROM and independence with ADL's  Progressing 4/20/2023            Long Term Goals: 3 weeks     Goal Status    Pt. to report decreased right shoulder pain </= 2/10 at worst to increase tolerance for performing overhead reaching      Pt. to demonstrate proper cervical and scapula retraction requiring minimum verbal cues from PT to perform     Pt. to demonstrate an increase in right shoulder elevation AROM to >15 deg. to promote greater ease with lifting tasks.      Pt. to demonstrate an increase in right shoulder internal and external AROM to +10 deg. to promote greater ease self care skills      Pt. to demonstrate increased MMT for right shoulder flexion to 4/5 to improve ADL tolerance       Pt. to demonstrate increased MMT for mid-trap/lower trap strength to 4/5 to improve scapula stability during ADL and home related chores.      Pt. to demonstrate increased MMT to serratus anterior to 4/5 to improve scapula stability during repetitive UE tasks      Pt to  tolerate HEP to improve ROM and independence with ADL's           Plan     Continued with current POC      Jay Tan, PEDRO ,  4/20/2023

## 2023-04-25 ENCOUNTER — OFFICE VISIT (OUTPATIENT)
Dept: URGENT CARE | Facility: CLINIC | Age: 53
End: 2023-04-25
Payer: COMMERCIAL

## 2023-04-25 VITALS
DIASTOLIC BLOOD PRESSURE: 89 MMHG | SYSTOLIC BLOOD PRESSURE: 125 MMHG | HEART RATE: 57 BPM | TEMPERATURE: 98 F | OXYGEN SATURATION: 97 %

## 2023-04-25 DIAGNOSIS — S46.911D STRAIN OF RIGHT SHOULDER, SUBSEQUENT ENCOUNTER: Primary | ICD-10-CM

## 2023-04-25 DIAGNOSIS — Z02.6 ENCOUNTER RELATED TO WORKER'S COMPENSATION CLAIM: ICD-10-CM

## 2023-04-25 PROCEDURE — 99213 OFFICE O/P EST LOW 20 MIN: CPT | Mod: S$GLB,,, | Performed by: PHYSICIAN ASSISTANT

## 2023-04-25 PROCEDURE — 99213 PR OFFICE/OUTPT VISIT, EST, LEVL III, 20-29 MIN: ICD-10-PCS | Mod: S$GLB,,, | Performed by: PHYSICIAN ASSISTANT

## 2023-04-25 NOTE — LETTER
Urgent Care - [address] [address]  Phone: [phone]  Fax: [phone]  [facility]: [email or web address]    Pt Name: Dada Jean  Injury Date: 03/22/2023   Employee ID:  Date of Treatment: 04/25/2023   Company: [employer]      Appointment Time: 08:10 AM Arrived: 8:09 AM   Provider: Brad Hager PA-C Time Out: 8:45 AM     Office Treatment:   1. Strain of right shoulder, subsequent encounter    2. Encounter related to worker's compensation claim          Patient Instructions: Attention not to aggravate affected area, Daily home exercises/warm soaks, Continue Physical Therapy      Restrictions: Regular Duty     Return Appointment: 5/16/2023 at 8:30 AM    anthony

## 2023-04-25 NOTE — PROGRESS NOTES
Subjective:      Patient ID: Dada Jean is a 52 y.o. male.    Chief Complaint: Shoulder Injury (RIGHT SHOULDER)    KW  Patient's place of employment - [employer]  Patient's job title -   Date of Injury - 03/22/2023  Body part injured - RIGHT SHOULDER  Current work status per last visit - LIGHT  Improved, same, or worse - SAME  Pain Scale right now (1-10) -  5    Patient presents for follow-up right shoulder strain.  He reports slight improvement since last office visit.  He has had 3 physical therapy visits.  He says the 1st visit helped improve shoulder pain slightly however has since plateaued.  He is using diclofenac gel daily.  Patient reports pain is worse with activity, especially overhead and lifting activities. MEB    Shoulder Injury   Pertinent negatives include no numbness.   Neck: Negative for neck pain.   Musculoskeletal:  Positive for pain, joint pain and abnormal ROM of joint.   Neurological:  Negative for numbness and tingling.   Objective:     Physical Exam  Vitals and nursing note reviewed.   Constitutional:       General: He is not in acute distress.     Appearance: He is well-developed. He is not diaphoretic.   HENT:      Head: Normocephalic and atraumatic.      Right Ear: Hearing and external ear normal.      Left Ear: Hearing and external ear normal.      Nose: Nose normal. No nasal deformity.   Eyes:      General: Lids are normal. No scleral icterus.     Conjunctiva/sclera: Conjunctivae normal.   Neck:      Trachea: Trachea normal.   Cardiovascular:      Pulses: Normal pulses.   Pulmonary:      Effort: Pulmonary effort is normal. No respiratory distress.      Breath sounds: No stridor.   Musculoskeletal:      Right shoulder: Tenderness present. No swelling, deformity or crepitus. Decreased range of motion. Normal strength. Normal pulse.        Arms:       Cervical back: Normal range of motion.      Comments: Right shoulder:  AROM flexion 180 degrees, abduction 180 degrees, ER 90  degrees, IR L3 (L T5).  Calvin Ahuja negative  Speed's positive   Skin:     General: Skin is warm and dry.      Capillary Refill: Capillary refill takes less than 2 seconds.   Neurological:      Mental Status: He is alert. He is not disoriented.      GCS: GCS eye subscore is 4. GCS verbal subscore is 5. GCS motor subscore is 6.      Sensory: No sensory deficit.   Psychiatric:         Attention and Perception: He is attentive.         Speech: Speech normal.         Behavior: Behavior normal.      Assessment:      1. Strain of right shoulder, subsequent encounter    2. Encounter related to worker's compensation claim      Plan:     Exam reveals likely biceps tendon strain.  Patient to continue physical therapy, home exercises and warm soaks.  I will see patient back in 3 weeks.  Consider MRI if no significant improvement at next office visit.     Patient Instructions: Attention not to aggravate affected area, Daily home exercises/warm soaks, Continue Physical Therapy   Restrictions: Regular Duty  Follow up in about 3 weeks (around 5/16/2023).

## 2023-04-26 ENCOUNTER — CLINICAL SUPPORT (OUTPATIENT)
Dept: REHABILITATION | Facility: HOSPITAL | Age: 53
End: 2023-04-26
Payer: COMMERCIAL

## 2023-04-26 DIAGNOSIS — M25.611 DECREASED RANGE OF MOTION OF RIGHT SHOULDER: Primary | ICD-10-CM

## 2023-04-26 PROCEDURE — 97140 MANUAL THERAPY 1/> REGIONS: CPT | Mod: PN

## 2023-04-26 PROCEDURE — 97110 THERAPEUTIC EXERCISES: CPT | Mod: PN

## 2023-04-26 PROCEDURE — 97112 NEUROMUSCULAR REEDUCATION: CPT | Mod: PN

## 2023-04-26 NOTE — PROGRESS NOTES
Physical Therapy Daily Treatment Note     Name: Dada Jean  [facility] Number: [id number]    Therapy Diagnosis:   Encounter Diagnosis   Name Primary?    Decreased range of motion of right shoulder Yes     Physician: Brad Hager PA-C    Visit Date: 2023    Physician Orders: PT Eval and Treat   Medical Diagnosis from Referral: S46.911D (ICD-10-CM) - Strain of right shoulder, subsequent encounter  Evaluation Date: 2023  Plan of Care Expiration: 4/3/24 or  Visit  Authorization Period Expiration: 4/3/24  Visit # / Visits authorized:   Remaining Visits Scheduled -     Eval Visit FOTO-  (Date/Score/Turn Green)   5th Visit FOTO   -  (Date/Score/Turn Green)   10th Visit FOTO  -  (Date/Score/Turn Green)   D/C FOTO          -  (Date/Score/Turn Green)    Time In: 0315  Time Out: 0400 pm  Billable Time: 45 minutes  Non-Billable Time: 00 minutes    Precautions: Standard  Insurance: Payor: GENER[employer] WORKERS COMP / Plan: GENERIC WORKER'S COMP / Product Type: Worker's Comp /     Subjective     Pt reports: continued pain with abduction / reaching   He is compliant with home exercise program.  Response to previous treatment: shoulder feels the same    Pre-Treatment Pain Ratin/10  Post-Treatment Pain Ratin/10  Location: right shoulder/elbow      Objective     Dada received therapeutic exercises to develop strength, ROM, and flexibility for 25 minutes including:      Shrugs - 30 repititions    Scapular retractions - 30 repetitions    passive range of motion flexion in standing - X2'   Cervical rotation, lateral flexion and flex/ext - 30 repititions    Table slides flexion, abduction , scaption - 30 repititions    Supine Dowel flexion 2x10   Supine dowel abduction 2x10  Supine ER stretch 2x10 5s hold  Prayer stretch 3x30s  Corner stretch - 1.5'  Stading door way external rotation  stretch - 1.5'  Sleeper stretch - 1.5'    Dada participated in neuromuscular re-education activities to improve:  Coordination, Kinesthetic, and Proprioception for 10 minutes. The following activities were included:  Isometric ER/IR/flexion/abduction 2x10      *Bold exercise performed       Dada received the following manual therapy techniques: Joint mobilizations and Soft tissue Mobilization were applied to the: right shoulder for 10 minutes, including:    Visit Number:  2 out of 9   Manual Therapy  (amplitude and time)     IASTM to tricep NP   PROM of shoulder all planes NP                   Dry Needling Performed by Damien Santiago DPT, Cert. DN: The patient was cleared of all contraindications and educated on the risks and effects of dry needling, and post needling expectations. The patient was agreeable to dry needling treatment. Pt signed consent form pre needling. Dry Needling was performed using 0.30 x 40 mm needles to biceps brachii from anterior to posterior with electrical stimulation applied at 2 Hz for 10 minutes. No adverse affects were noted. Performed no periosteal pecking and bidirectional twisting prior to stim.         Home Exercises Provided and Patient Education Provided     Education provided:   - Continue with HEP    Written Home Exercises Provided: Patient instructed to cont prior HEP.  Exercises were reviewed and Dada was able to demonstrate them prior to the end of the session.  Dada demonstrated fair  understanding of the education provided.     See EMR under Patient Instructions for exercises provided prior visit.    Assessment     Patient issued additional home exercise program  to improve flexibility of the biceps tendon. Patient tolerated an increase in therapeutic exercise without additional pain. Patient continues to improve upper extremites mobility with a decrease in intensity and frequency. Patient will continue to benefit from skilled therapy to address deficits.     Dada Is progressing well towards His goals.     Pt prognosis is Good.     Pt will continue to benefit from skilled  outpatient physical therapy to address the deficits listed in the problem list box on initial evaluation, provide pt/family education and to maximize pt's level of independence in the home and community environment.     Pt's spiritual, cultural and educational needs considered and pt agreeable to plan of care and goals.    Anticipated barriers to physical therapy: None identified at eval    Goals:  Short Term Goals: 1 weeks        Goal   Status   Pt. to report decreased right shoulder pain </= 6/10 at worst to increase tolerance for performing overhead reaching   Progressing 4/26/2023     Pt. to demonstrate proper cervical and scapula retraction requiring minimum verbal cues from PT to perform Progressing 4/26/2023      Pt. to demonstrate an increase in right shoulder elevation AROM to >100 deg. to promote greater ease with lifting tasks.  Progressing 4/26/2023      Pt. to demonstrate an increase in right shoulder internal and external AROM to +5 deg. to promote greater ease self care skills  Progressing 4/26/2023      Pt to tolerate HEP to improve ROM and independence with ADL's  Progressing 4/26/2023            Long Term Goals: 3 weeks     Goal Status    Pt. to report decreased right shoulder pain </= 2/10 at worst to increase tolerance for performing overhead reaching      Pt. to demonstrate proper cervical and scapula retraction requiring minimum verbal cues from PT to perform     Pt. to demonstrate an increase in right shoulder elevation AROM to >15 deg. to promote greater ease with lifting tasks.      Pt. to demonstrate an increase in right shoulder internal and external AROM to +10 deg. to promote greater ease self care skills      Pt. to demonstrate increased MMT for right shoulder flexion to 4/5 to improve ADL tolerance       Pt. to demonstrate increased MMT for mid-trap/lower trap strength to 4/5 to improve scapula stability during ADL and home related chores.      Pt. to demonstrate increased MMT to  serratus anterior to 4/5 to improve scapula stability during repetitive UE tasks      Pt to tolerate HEP to improve ROM and independence with ADL's           Plan     Continued with current POC      Damien Santiago, PT ,  4/26/2023

## 2023-04-27 ENCOUNTER — CLINICAL SUPPORT (OUTPATIENT)
Dept: REHABILITATION | Facility: HOSPITAL | Age: 53
End: 2023-04-27
Payer: COMMERCIAL

## 2023-04-27 DIAGNOSIS — M25.611 DECREASED RANGE OF MOTION OF RIGHT SHOULDER: Primary | ICD-10-CM

## 2023-04-27 PROCEDURE — 97140 MANUAL THERAPY 1/> REGIONS: CPT | Mod: PN

## 2023-04-27 PROCEDURE — 97110 THERAPEUTIC EXERCISES: CPT | Mod: PN

## 2023-04-27 PROCEDURE — 97112 NEUROMUSCULAR REEDUCATION: CPT | Mod: PN

## 2023-04-27 NOTE — PROGRESS NOTES
Physical Therapy Daily Treatment Note     Name: Dada Jean  [facility] Number: [id number]    Therapy Diagnosis:   Encounter Diagnosis   Name Primary?    Decreased range of motion of right shoulder Yes     Physician: No ref. provider found    Visit Date: 2023    Physician Orders: PT Eval and Treat   Medical Diagnosis from Referral: S46.911D (ICD-10-CM) - Strain of right shoulder, subsequent encounter  Evaluation Date: 2023  Plan of Care Expiration: 4/3/24 or 9th Visit  Authorization Period Expiration: 4/3/24  Visit # / Visits authorized:   Remaining Visits Scheduled - 08    Eval Visit FOTO-  (Date/Score/Turn Green)   5th Visit FOTO   -  (Date/Score/Turn Green)   10th Visit FOTO  -  (Date/Score/Turn Green)   D/C FOTO          -  (Date/Score/Turn Green)    Time In: 0800  Time Out: 0845 pm  Billable Time: 45 minutes  Non-Billable Time: 00 minutes    Precautions: Standard  Insurance: Payor: GENER[employer] WORKERS COMP / Plan: GENERIC WORKER'S COMP / Product Type: Worker's Comp /     Subjective     Pt reports: continued pain with abduction   He is compliant with home exercise program.  Response to previous treatment: shoulder feels the same    Pre-Treatment Pain Ratin/10  Post-Treatment Pain Ratin/10  Location: right shoulder/elbow      Objective     Dada received therapeutic exercises to develop strength, ROM, and flexibility for 25 minutes including:      Shrugs - 30 repititions    Scapular retractions - 30 repetitions    passive range of motion flexion in standing - X2'   Cervical rotation, lateral flexion and flex/ext - 30 repititions    Table slides flexion, abduction , scaption - 30 repititions    Supine Dowel flexion 2x10   Supine dowel abduction 2x10  Supine ER stretch 2x10 5s hold  Prayer stretch 3x30s  Corner stretch - 1.5'  Stading door way external rotation  stretch - 1.5'  Sleeper stretch - 1.5  Deltoid / shoulder flexion - 30 repititions       Dada participated in neuromuscular re-education  activities to improve: Coordination, Kinesthetic, and Proprioception for 10 minutes. The following activities were included:  Isometric ER/IR/flexion/abduction 2x10      *Bold exercise performed       Dada received the following manual therapy techniques: Joint mobilizations and Soft tissue Mobilization were applied to the: right shoulder for 10 minutes, including:    Visit Number:  2 out of 9   Manual Therapy  (amplitude and time)     IASTM to tricep NP   PROM of shoulder all planes NP   STM (massage gun)  Done                Dry Needling Performed by Damien Santiago DPT, Cert. DN: The patient was cleared of all contraindications and educated on the risks and effects of dry needling, and post needling expectations. The patient was agreeable to dry needling treatment. Pt signed consent form pre needling. Dry Needling was performed using 0.30 x 40 mm needles to biceps brachii from anterior to posterior with electrical stimulation applied at 2 Hz for 10 minutes. No adverse affects were noted. Performed no periosteal pecking and bidirectional twisting prior to stim. - not performed         Home Exercises Provided and Patient Education Provided     Education provided:   - Continue with HEP    Written Home Exercises Provided: Patient instructed to cont prior HEP.  Exercises were reviewed and Dada was able to demonstrate them prior to the end of the session.  Dada demonstrated fair  understanding of the education provided.     See EMR under Patient Instructions for exercises provided prior visit.    Assessment     Patient currently presents to therapy with reports of reduction in right shoulder pain following last treatment session. Patient reports compliance and minor stiffness this AM that has reduced with movement. Patient reports a reduction in pain following STM reporting a reduction in pain with movement. Patient will continue to benefit from skilled therapy to address current deficits.     Dada Is progressing  well towards His goals.     Pt prognosis is Good.     Pt will continue to benefit from skilled outpatient physical therapy to address the deficits listed in the problem list box on initial evaluation, provide pt/family education and to maximize pt's level of independence in the home and community environment.     Pt's spiritual, cultural and educational needs considered and pt agreeable to plan of care and goals.    Anticipated barriers to physical therapy: None identified at eval    Goals:  Short Term Goals: 1 weeks        Goal   Status   Pt. to report decreased right shoulder pain </= 6/10 at worst to increase tolerance for performing overhead reaching   Progressing 4/27/2023     Pt. to demonstrate proper cervical and scapula retraction requiring minimum verbal cues from PT to perform Progressing 4/27/2023      Pt. to demonstrate an increase in right shoulder elevation AROM to >100 deg. to promote greater ease with lifting tasks.  Progressing 4/27/2023      Pt. to demonstrate an increase in right shoulder internal and external AROM to +5 deg. to promote greater ease self care skills  Progressing 4/27/2023      Pt to tolerate HEP to improve ROM and independence with ADL's  Progressing 4/27/2023            Long Term Goals: 3 weeks     Goal Status    Pt. to report decreased right shoulder pain </= 2/10 at worst to increase tolerance for performing overhead reaching      Pt. to demonstrate proper cervical and scapula retraction requiring minimum verbal cues from PT to perform     Pt. to demonstrate an increase in right shoulder elevation AROM to >15 deg. to promote greater ease with lifting tasks.      Pt. to demonstrate an increase in right shoulder internal and external AROM to +10 deg. to promote greater ease self care skills      Pt. to demonstrate increased MMT for right shoulder flexion to 4/5 to improve ADL tolerance       Pt. to demonstrate increased MMT for mid-trap/lower trap strength to 4/5 to improve  scapula stability during ADL and home related chores.      Pt. to demonstrate increased MMT to serratus anterior to 4/5 to improve scapula stability during repetitive UE tasks      Pt to tolerate HEP to improve ROM and independence with ADL's           Plan     Continued with current POC      Damien Santiago, PT ,  4/27/2023

## 2023-05-01 ENCOUNTER — CLINICAL SUPPORT (OUTPATIENT)
Dept: REHABILITATION | Facility: HOSPITAL | Age: 53
End: 2023-05-01
Payer: COMMERCIAL

## 2023-05-01 DIAGNOSIS — M25.611 DECREASED RANGE OF MOTION OF RIGHT SHOULDER: Primary | ICD-10-CM

## 2023-05-01 PROCEDURE — 97112 NEUROMUSCULAR REEDUCATION: CPT | Mod: PN,CQ

## 2023-05-01 PROCEDURE — 97140 MANUAL THERAPY 1/> REGIONS: CPT | Mod: PN,CQ

## 2023-05-01 PROCEDURE — 97110 THERAPEUTIC EXERCISES: CPT | Mod: PN,CQ

## 2023-05-01 NOTE — PROGRESS NOTES
Physical Therapy Daily Treatment Note     Name: Dada Jean  [facility] Number: [id number]    Therapy Diagnosis:   Encounter Diagnosis   Name Primary?    Decreased range of motion of right shoulder Yes     Physician: Brad Hager PA-C    Visit Date: 5/1/2023    Physician Orders: PT Eval and Treat   Medical Diagnosis from Referral: S46.911D (ICD-10-CM) - Strain of right shoulder, subsequent encounter  Evaluation Date: 4/14/2023  Plan of Care Expiration: 4/3/24 or 9th Visit  Authorization Period Expiration: 4/3/24  Visit # / Visits authorized: 4/ 9  Remaining Visits Scheduled - 05  PTA visit #: 1/6    Eval Visit FOTO-  04/14/2023 - 40.1  5th Visit FOTO   -  05/01/2022 - 21.07  10th Visit FOTO  -  (Date/Score/Turn Green)   D/C FOTO          -  (Date/Score/Turn Green)    Time In: 11:38  Time Out: 12:18 pm  Billable Time: 40 minutes  Non-Billable Time: 00 minutes    Precautions: Standard  Insurance: Payor: GENERIC WORKERS COMP / Plan: GENERIC WORKER'S COMP / Product Type: Worker's Comp /     Subjective     Pt reports: shoulder and arm feeling better but still hurts sometimes  He is compliant with home exercise program.  Response to previous treatment: shoulder feels the same    Pre-Treatment Pain Rating: 3/10  Post-Treatment Pain Rating: 3/10  Location: right shoulder/elbow      Objective     Dada received therapeutic exercises to develop strength, ROM, and flexibility for 20 minutes including:      Shrugs - 30 repititions    Scapular retractions - 30 repetitions    passive range of motion flexion in standing - X2'   Cervical rotation, lateral flexion and flex/ext - 30 repititions    Table slides flexion, abduction , scaption - 30 repititions    Supine Dowel flexion 2x10   Supine dowel abduction 2x10  Supine ER stretch 2x10 5s hold  Prayer stretch 3x30s  Corner stretch - 1.5'  Stading door way external rotation  stretch - 1.5' (attempted but too painful)  Sleeper stretch - 1.5  Deltoid / shoulder flexion - 30  repetitions   Supine pec stretch 1/2 bolster - 4'      Dada participated in neuromuscular re-education activities to improve: Coordination, Kinesthetic, and Proprioception for 10 minutes. The following activities were included:  Isometric ER/IR/flexion/abduction 2x10 (more pain with IR)      *Bold exercise performed       Dada received the following manual therapy techniques: Joint mobilizations and Soft tissue Mobilization were applied to the: right shoulder for 10 minutes, including:    Visit Number:  2 out of 9   Manual Therapy  (amplitude and time)     IASTM to tricep NP   PROM of shoulder all planes NP   STM (massage gun)  Done                Dry Needling Performed by Damien Santiago DPT, Cert. DN: The patient was cleared of all contraindications and educated on the risks and effects of dry needling, and post needling expectations. The patient was agreeable to dry needling treatment. Pt signed consent form pre needling. Dry Needling was performed using 0.30 x 40 mm needles to biceps brachii from anterior to posterior with electrical stimulation applied at 2 Hz for 10 minutes. No adverse affects were noted. Performed no periosteal pecking and bidirectional twisting prior to stim. - not performed         Home Exercises Provided and Patient Education Provided     Education provided:   - Continue with HEP    Written Home Exercises Provided: Patient instructed to cont prior HEP.  Exercises were reviewed and Dada was able to demonstrate them prior to the end of the session.  Dada demonstrated fair  understanding of the education provided.     See EMR under Patient Instructions for exercises provided prior visit.    Assessment     Pt tolerated session fairly well. He presented to therapy with low pain. Doorway stretch not tolerable due to pain. Isometric ER  and IR also caused some pain but pt completed all reps. Massage gun used on tricep, this reduced pain and improved pt's ER ROM. Dada had no adverse effects  with exercises today and will continue to benefit from skilled therapy.     Dada Is progressing well towards His goals.     Pt prognosis is Good.     Pt will continue to benefit from skilled outpatient physical therapy to address the deficits listed in the problem list box on initial evaluation, provide pt/family education and to maximize pt's level of independence in the home and community environment.     Pt's spiritual, cultural and educational needs considered and pt agreeable to plan of care and goals.    Anticipated barriers to physical therapy: None identified at eval    Goals:  Short Term Goals: 1 weeks        Goal   Status   Pt. to report decreased right shoulder pain </= 6/10 at worst to increase tolerance for performing overhead reaching   Progressing 5/1/2023     Pt. to demonstrate proper cervical and scapula retraction requiring minimum verbal cues from PT to perform Progressing 5/1/2023      Pt. to demonstrate an increase in right shoulder elevation AROM to >100 deg. to promote greater ease with lifting tasks.  Progressing 5/1/2023      Pt. to demonstrate an increase in right shoulder internal and external AROM to +5 deg. to promote greater ease self care skills  Progressing 5/1/2023      Pt to tolerate HEP to improve ROM and independence with ADL's  Progressing 5/1/2023            Long Term Goals: 3 weeks     Goal Status    Pt. to report decreased right shoulder pain </= 2/10 at worst to increase tolerance for performing overhead reaching      Pt. to demonstrate proper cervical and scapula retraction requiring minimum verbal cues from PT to perform     Pt. to demonstrate an increase in right shoulder elevation AROM to >15 deg. to promote greater ease with lifting tasks.      Pt. to demonstrate an increase in right shoulder internal and external AROM to +10 deg. to promote greater ease self care skills      Pt. to demonstrate increased MMT for right shoulder flexion to 4/5 to improve ADL tolerance        Pt. to demonstrate increased MMT for mid-trap/lower trap strength to 4/5 to improve scapula stability during ADL and home related chores.      Pt. to demonstrate increased MMT to serratus anterior to 4/5 to improve scapula stability during repetitive UE tasks      Pt to tolerate HEP to improve ROM and independence with ADL's           Plan     Continued with current POC      Jay Tan PTA ,  5/1/2023

## 2023-05-03 ENCOUNTER — CLINICAL SUPPORT (OUTPATIENT)
Dept: REHABILITATION | Facility: HOSPITAL | Age: 53
End: 2023-05-03
Payer: COMMERCIAL

## 2023-05-03 DIAGNOSIS — M25.611 DECREASED RANGE OF MOTION OF RIGHT SHOULDER: Primary | ICD-10-CM

## 2023-05-03 PROCEDURE — 97112 NEUROMUSCULAR REEDUCATION: CPT | Mod: PN

## 2023-05-03 PROCEDURE — 97110 THERAPEUTIC EXERCISES: CPT | Mod: PN

## 2023-05-03 NOTE — PROGRESS NOTES
Physical Therapy Daily Treatment Note     Name: Dada Jean  [facility] Number: [id number]    Therapy Diagnosis:   Encounter Diagnosis   Name Primary?    Decreased range of motion of right shoulder Yes     Physician: Brad Hager PA-C    Visit Date: 5/3/2023    Physician Orders: PT Eval and Treat   Medical Diagnosis from Referral: S46.911D (ICD-10-CM) - Strain of right shoulder, subsequent encounter  Evaluation Date: 4/14/2023  Plan of Care Expiration: 4/3/24 or 9th Visit  Authorization Period Expiration: 4/3/24  Visit # / Visits authorized: 4/ 9  Remaining Visits Scheduled - 05  PTA visit #: 1/6    Eval Visit FOTO-  04/14/2023 - 40.1  5th Visit FOTO   -  05/01/2022 - 21.07  10th Visit FOTO  -  (Date/Score/Turn Green)   D/C FOTO          -  (Date/Score/Turn Green)    Time In: 3:15 PM  Time Out: 4:00 PM  Billable Time: 45 minutes (1TE, 2NR)  Non-Billable Time: 00 minutes    Precautions: Standard  Insurance: Payor: GENERIC WORKERS COMP / Plan: GENERIC WORKER'S COMP / Product Type: Worker's Comp /     Subjective     Pt reports: 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 Patient noted increase in elbow pain related to shoulder injury.  He is compliant with home exercise program.  Response to previous treatment: shoulder feels the same    Pre-Treatment Pain Rating: 3/10  Post-Treatment Pain Rating: 3/10  Location: right shoulder/elbow      Objective     Dada received therapeutic exercises to develop strength, ROM, and flexibility for 20 minutes including:      Shrugs - 30 repititions    Scapular retractions - 40 repetitions    IR rotation stretch on door for scapular stabilization 10'' hold x10  Shoulder scaption to shoulder level no added weight 3x10  passive range of motion flexion in standing - X2'   Cervical rotation, lateral flexion and flex/ext - 30 repititions    Table slides flexion, abduction , scaption - 30 repititions    Supine Dowel flexion 2x10   Supine dowel  abduction 2x10  Supine ER stretch 2x10 5s hold  Prayer stretch 3x30s  Corner stretch - 1.5'  Stading door way external rotation  stretch - 1.5' (attempted but too painful)  Sleeper stretch - 1.5  Deltoid / shoulder flexion - 30 repetitions   Supine pec stretch 1/2 bolster - 4'    Dada participated in neuromuscular re-education activities to improve: Coordination, Kinesthetic, and Proprioception for 25 minutes. The following activities were included:  Isometric ER/IR/flexion/abduction 2x10 (more pain with IR)  Prone T's 2x10   Prone I's 2x10  Prone Rows 3x10 3lb DB  Serratus punches in supine no added weight 3x10  Elbow flexion eccentrics w/3lb DB    *Bold exercise performed       Dada received the following manual therapy techniques: Joint mobilizations and Soft tissue Mobilization were applied to the: right shoulder for 00 minutes, including:    Visit Number:  2 out of 9   Manual Therapy  (amplitude and time)     IASTM to tricep NP   PROM of shoulder all planes NP   STM (massage gun)  NP                Dry Needling Performed by Damien Santiago DPT, Cert. DN: The patient was cleared of all contraindications and educated on the risks and effects of dry needling, and post needling expectations. The patient was agreeable to dry needling treatment. Pt signed consent form pre needling. Dry Needling was performed using 0.30 x 40 mm needles to biceps brachii from anterior to posterior with electrical stimulation applied at 2 Hz for 10 minutes. No adverse affects were noted. Performed no periosteal pecking and bidirectional twisting prior to stim. - not performed         Home Exercises Provided and Patient Education Provided     Education provided:   - Continue with HEP    Written Home Exercises Provided: Patient instructed to cont prior HEP.  Exercises were reviewed and Dada was able to demonstrate them prior to the end of the session.  Dada demonstrated fair  understanding of the education provided.     See EMR  "under Patient Instructions for exercises provided prior visit.    Assessment     Pt tolerated session fairly well. He presented to treatment with increase in R elbow pain. This elbow pain was elicited with resistive testing. Patient was educated on symptoms of shoulder dysfunction and overload of "neighboring" joints. Patient tolerated advances in shoulder and elbow activities/exercises well with no increase in reported pain. Patient to continue with current POC.    Dada Is progressing well towards His goals.     Pt prognosis is Good.     Pt will continue to benefit from skilled outpatient physical therapy to address the deficits listed in the problem list box on initial evaluation, provide pt/family education and to maximize pt's level of independence in the home and community environment.     Pt's spiritual, cultural and educational needs considered and pt agreeable to plan of care and goals.    Anticipated barriers to physical therapy: None identified at eval    Goals:  Short Term Goals: 1 weeks        Goal   Status   Pt. to report decreased right shoulder pain </= 6/10 at worst to increase tolerance for performing overhead reaching   Progressing 5/3/2023     Pt. to demonstrate proper cervical and scapula retraction requiring minimum verbal cues from PT to perform Progressing 5/3/2023      Pt. to demonstrate an increase in right shoulder elevation AROM to >100 deg. to promote greater ease with lifting tasks.  Progressing 5/3/2023      Pt. to demonstrate an increase in right shoulder internal and external AROM to +5 deg. to promote greater ease self care skills  Progressing 5/3/2023      Pt to tolerate HEP to improve ROM and independence with ADL's  Progressing 5/3/2023            Long Term Goals: 3 weeks     Goal Status    Pt. to report decreased right shoulder pain </= 2/10 at worst to increase tolerance for performing overhead reaching      Pt. to demonstrate proper cervical and scapula retraction requiring " minimum verbal cues from PT to perform     Pt. to demonstrate an increase in right shoulder elevation AROM to >15 deg. to promote greater ease with lifting tasks.      Pt. to demonstrate an increase in right shoulder internal and external AROM to +10 deg. to promote greater ease self care skills      Pt. to demonstrate increased MMT for right shoulder flexion to 4/5 to improve ADL tolerance       Pt. to demonstrate increased MMT for mid-trap/lower trap strength to 4/5 to improve scapula stability during ADL and home related chores.      Pt. to demonstrate increased MMT to serratus anterior to 4/5 to improve scapula stability during repetitive UE tasks      Pt to tolerate HEP to improve ROM and independence with ADL's           Plan     Continued with current POC      Chilo Baig, PT, DPT  5/3/2023

## 2023-05-05 ENCOUNTER — CLINICAL SUPPORT (OUTPATIENT)
Dept: REHABILITATION | Facility: HOSPITAL | Age: 53
End: 2023-05-05
Payer: COMMERCIAL

## 2023-05-05 DIAGNOSIS — M25.611 DECREASED RANGE OF MOTION OF RIGHT SHOULDER: Primary | ICD-10-CM

## 2023-05-05 PROCEDURE — 97112 NEUROMUSCULAR REEDUCATION: CPT | Mod: PN

## 2023-05-05 PROCEDURE — 97110 THERAPEUTIC EXERCISES: CPT | Mod: PN

## 2023-05-05 NOTE — PROGRESS NOTES
Physical Therapy Daily Treatment Note     Name: Dada Jean  [facility] Number: [id number]    Therapy Diagnosis:   Encounter Diagnosis   Name Primary?    Decreased range of motion of right shoulder Yes     Physician: Brad Hager PA-C    Visit Date: 2023    Physician Orders: PT Eval and Treat   Medical Diagnosis from Referral: S46.911D (ICD-10-CM) - Strain of right shoulder, subsequent encounter  Evaluation Date: 2023  Plan of Care Expiration: 4/3/24 or 9th Visit  Authorization Period Expiration: 4/3/24  Visit # / Visits authorized:   Remaining Visits Scheduled -   PTA visit #: 0/6    Eval Visit FOTO-  2023 - 40.1  5th Visit FOTO   -  2022 - 21.07  10th Visit FOTO  -  (Date/Score/Turn Green)   D/C FOTO          -  (Date/Score/Turn Green)    Time In: 11:00 AM  Time Out: 11:45 AM  Billable Time: 45 minutes (1TE, 2NR)  Non-Billable Time: 00 minutes    Precautions: Standard  Insurance: Payor: GENERIC WORKERS COMP / Plan: GENERIC WORKER'S COMP / Product Type: Worker's Comp /     Subjective     Pt reports: relief in pain at R elbow since last visit and attributes this to the manual therapy and exercises. Patient had difficulty sleeping due to R shoulder pain.  He is compliant with home exercise program.  Response to previous treatment: shoulder feels the same    Pre-Treatment Pain Ratin/10  Post-Treatment Pain Ratin/10  Location: right shoulder/elbow      Objective     Dada received therapeutic exercises to develop strength, ROM, and flexibility for 17 minutes including:      Shrugs - 30 repititions    Scapular retractions - 40 repetitions    Self mobilization of FCU muscle origin and medial epicondyle  IR rotation stretch on door for scapular stabilization 10'' hold x10  Shoulder scaption to shoulder level no added weight 3x10  passive range of motion flexion in standing - X2'   Cervical rotation, lateral flexion and flex/ext - 30 repititions    Table slides flexion, abduction ,  scaption - 30 repititions    Supine Dowel flexion 2x10   Supine dowel abduction 2x10  Supine ER stretch 2x10 5s hold  Prayer stretch 3x30s  Corner stretch - 1.5'  Stading door way external rotation  stretch - 1.5' (attempted but too painful)  Sleeper stretch - 1.5  Deltoid / shoulder flexion - 30 repetitions   Supine pec stretch 1/2 bolster - 4'    Dada participated in neuromuscular re-education activities to improve: Coordination, Kinesthetic, and Proprioception for 28 minutes. The following activities were included:  Isometric ER/IR/flexion/abduction 2x10 (more pain with IR)  Prone T's 3x10   Prone I's 3x10  Prone Rows 3x10 4lb DB  Axial loading through R shoulder with ball at hip height 2x10 in flexion and abduction CW/CCW  Serratus punches in supine no added weight 3x10  Wrist flexion eccentrics w/4lb DB    *Bold exercise performed       Dada received the following manual therapy techniques: Joint mobilizations and Soft tissue Mobilization were applied to the: right shoulder for 00 minutes, including:    Visit Number:  2 out of 9   Manual Therapy  (amplitude and time)     IASTM to tricep NP   PROM of shoulder all planes NP   STM (massage gun)  NP                Dry Needling Performed by Damien Santiago DPT, Cert. DN: The patient was cleared of all contraindications and educated on the risks and effects of dry needling, and post needling expectations. The patient was agreeable to dry needling treatment. Pt signed consent form pre needling. Dry Needling was performed using 0.30 x 40 mm needles to biceps brachii from anterior to posterior with electrical stimulation applied at 2 Hz for 10 minutes. No adverse affects were noted. Performed no periosteal pecking and bidirectional twisting prior to stim. - not performed         Home Exercises Provided and Patient Education Provided     Education provided:   - Continue with HEP    Written Home Exercises Provided: Patient instructed to cont prior HEP.  Exercises were  reviewed and Dada was able to demonstrate them prior to the end of the session.  Dada demonstrated fair  understanding of the education provided.     See EMR under Patient Instructions for exercises provided prior visit.    Assessment     Pt tolerated treatment session well with no report of pain throughout exercises and activities. Patient continues to advance in functional strength and mobility of R shoulder. Shoulder stabilization and education on shoulder mechanics is emphasized during current treatment session. Continue with current POC.     Dada Is progressing well towards His goals.     Pt prognosis is Good.     Pt will continue to benefit from skilled outpatient physical therapy to address the deficits listed in the problem list box on initial evaluation, provide pt/family education and to maximize pt's level of independence in the home and community environment.     Pt's spiritual, cultural and educational needs considered and pt agreeable to plan of care and goals.    Anticipated barriers to physical therapy: None identified at eval    Goals:  Short Term Goals: 1 weeks        Goal   Status   Pt. to report decreased right shoulder pain </= 6/10 at worst to increase tolerance for performing overhead reaching   Progressing 5/5/2023     Pt. to demonstrate proper cervical and scapula retraction requiring minimum verbal cues from PT to perform Progressing 5/5/2023      Pt. to demonstrate an increase in right shoulder elevation AROM to >100 deg. to promote greater ease with lifting tasks.  Progressing 5/5/2023      Pt. to demonstrate an increase in right shoulder internal and external AROM to +5 deg. to promote greater ease self care skills  Progressing 5/5/2023      Pt to tolerate HEP to improve ROM and independence with ADL's  Progressing 5/5/2023            Long Term Goals: 3 weeks     Goal Status    Pt. to report decreased right shoulder pain </= 2/10 at worst to increase tolerance for performing overhead  reaching      Pt. to demonstrate proper cervical and scapula retraction requiring minimum verbal cues from PT to perform     Pt. to demonstrate an increase in right shoulder elevation AROM to >15 deg. to promote greater ease with lifting tasks.      Pt. to demonstrate an increase in right shoulder internal and external AROM to +10 deg. to promote greater ease self care skills      Pt. to demonstrate increased MMT for right shoulder flexion to 4/5 to improve ADL tolerance       Pt. to demonstrate increased MMT for mid-trap/lower trap strength to 4/5 to improve scapula stability during ADL and home related chores.      Pt. to demonstrate increased MMT to serratus anterior to 4/5 to improve scapula stability during repetitive UE tasks      Pt to tolerate HEP to improve ROM and independence with ADL's           Plan     Continued with current POC      Chilo Baig, PT, DPT  5/5/2023

## 2023-05-08 ENCOUNTER — CLINICAL SUPPORT (OUTPATIENT)
Dept: REHABILITATION | Facility: HOSPITAL | Age: 53
End: 2023-05-08
Payer: COMMERCIAL

## 2023-05-08 DIAGNOSIS — M25.611 DECREASED RANGE OF MOTION OF RIGHT SHOULDER: Primary | ICD-10-CM

## 2023-05-08 PROCEDURE — 97112 NEUROMUSCULAR REEDUCATION: CPT | Mod: PN

## 2023-05-08 PROCEDURE — 97110 THERAPEUTIC EXERCISES: CPT | Mod: PN

## 2023-05-08 NOTE — PROGRESS NOTES
"    Physical Therapy Daily Treatment Note     Name: Dada Jean  [facility] Number: [id number]    Therapy Diagnosis:   Encounter Diagnosis   Name Primary?    Decreased range of motion of right shoulder Yes     Physician: Brad Hager PA-C    Visit Date: 2023    Physician Orders: PT Eval and Treat   Medical Diagnosis from Referral: S46.911D (ICD-10-CM) - Strain of right shoulder, subsequent encounter  Evaluation Date: 2023  Plan of Care Expiration: 23 or 9th Visit  Authorization Period Expiration: 4/3/24  Visit # / Visits authorized:   Remaining Visits Scheduled -   PTA visit #: 0/6    Eval Visit FOTO-  2023 - 40.1  5th Visit FOTO   -  2022 - 21.07  10th Visit FOTO  -  (Date/Score/Turn Green)   D/C FOTO          -  (Date/Score/Turn Green)      Time In: 11:45 AM  Time Out: 12:30 PM  Billable Time: 45 minutes (1TE, 2NR)  Non-Billable Time: 00 minutes    Precautions: Standard  Insurance: Payor: GENERIC WORKERS COMP / Plan: GENERIC WORKER'S COMP / Product Type: Worker's Comp /     Subjective     Pt reports: slight improvement in R shoulder pain since last visit. Elbow continues to be "nagging" but is functional  He is compliant with home exercise program.  Response to previous treatment: shoulder feels the same    Pre-Treatment Pain Ratin/10  Post-Treatment Pain Ratin/10  Location: right shoulder/elbow      Objective     Range of Motion/Strength:      Shoulder Right Pain/Dysfunction with Movement   AROM       Flexion (180)  120°  pain    Extension (60)  60°      Abduction (180)  100°  pain    Adduction (50)  20°      Internal rotation (70)  60°      ER (70) [at 45° shoulder abduction]  52°  pain           Left UE  5/5 4+/5 4/5 4-/5 3+/5 3/5 3-/5 2+/5 2/5 2-/5 1/5 0 NT   Shoulder  Flexion   x                         Shoulder Extension   x                         Shoulder Abduction   x                         Shoulder Adduction   x                         Shoulder  IR   x          "                Shoulder  ER   x                         Elbow Extension    x                         Elbow Flexion    x                         Rhomboids    x                         Upper Trap   x                         Mid Traps   x                         Lower Traps   x                         Serratus Anterior   x                            Right UE 5/5 4+/5 4/5 4-/5 3+/5 3/5 3-/5 2+/5 2/5 2-/5 1/5 0 NT   Shoulder  Flexion       x                     Shoulder Extension         x                   Shoulder Abduction       x                     Shoulder Adduction       x                     Shoulder  IR       x                    Shoulder  ER       x                     Elbow Extension    x                         Elbow Flexion       x                      Rhomboids        x                     Upper Trap   x                         Mid Traps         x                   Lower Traps       x                     Serratus Anterior   x                              Dada received therapeutic exercises to develop strength, ROM, and flexibility for 20 minutes including:    Reassessment  Shrugs - 30 repititions    Scapular retractions - 40 repetitions    Self mobilization of FCU muscle origin and medial epicondyle  Self mobilization of R lat musculature with foam roller 20 repetitions  IR rotation stretch on door for scapular stabilization 10'' hold x10  Shoulder scaption to shoulder level no added weight 3x10  Standing rows w/blue TB 3x10  passive range of motion flexion in standing - X2'   Cervical rotation, lateral flexion and flex/ext - 30 repititions    Table slides flexion, abduction , scaption - 30 repititions    Supine Dowel flexion 2x10   Supine dowel abduction 2x10  Supine ER stretch 2x10 5s hold  Prayer stretch 3x30s  Corner stretch - 1.5'  Stading door way external rotation  stretch - 1.5' (attempted but too painful)  Sleeper stretch - 1.5  Deltoid / shoulder flexion - 30 repetitions   Supine pec stretch 1/2  bolster - 4'    Dada participated in neuromuscular re-education activities to improve: Coordination, Kinesthetic, and Proprioception for 25 minutes. The following activities were included:  Isometric ER/IR/flexion/abduction 2x10 (more pain with IR)  Prone T's 3x10   Prone I's 3x10  Prone Rows 3x10 4lb DB  Shoulder taps on treatment table 2x10 B sides  Axial loading through R shoulder with ball at hip height 2x10 in flexion and abduction CW/CCW  Serratus punches in supine no added weight 4x10    Wrist flexion eccentrics w/4lb DB    *Bold exercise performed       Dada received the following manual therapy techniques: Joint mobilizations and Soft tissue Mobilization were applied to the: right shoulder for 00 minutes, including:    Visit Number:  2 out of 9   Manual Therapy  (amplitude and time)     IASTM to tricep NP   PROM of shoulder all planes NP   STM (massage gun)  NP                Dry Needling Performed by Damien Santiago DPT, Cert. DN: The patient was cleared of all contraindications and educated on the risks and effects of dry needling, and post needling expectations. The patient was agreeable to dry needling treatment. Pt signed consent form pre needling. Dry Needling was performed using 0.30 x 40 mm needles to biceps brachii from anterior to posterior with electrical stimulation applied at 2 Hz for 10 minutes. No adverse affects were noted. Performed no periosteal pecking and bidirectional twisting prior to stim. - not performed         Home Exercises Provided and Patient Education Provided     Education provided:   - Continue with HEP    Written Home Exercises Provided: Patient instructed to cont prior HEP.  Exercises were reviewed and Dada was able to demonstrate them prior to the end of the session.  Dada demonstrated fair  understanding of the education provided.     See EMR under Patient Instructions for exercises provided prior visit.    Assessment     Pt tolerated treatment session and  reassessment well with no report of increase in R shoulder pain. Patient reported relief with stretching and manual therapy of involved musculature. Patient demonstrated improvements in MMT'ing of R shoulder and AROM. Patient continues to demonstrate improvements as is noted by several of his completed short term and long term goals. Additional exercises tolerated well and required minimal verbal cues for functional mechanics. Pain is not as limiting as it previously was and patient has voiced gratitude with physical therapy services. Patient would benefit from continued physical therapy to address noted limitations. Patient to continue with current POC with plans of d/c to doctor for further evaluation of R shoulder dysfunction and pain.     Dada Is progressing well towards His goals.     Pt prognosis is Good.     Pt will continue to benefit from skilled outpatient physical therapy to address the deficits listed in the problem list box on initial evaluation, provide pt/family education and to maximize pt's level of independence in the home and community environment.     Pt's spiritual, cultural and educational needs considered and pt agreeable to plan of care and goals.    Anticipated barriers to physical therapy: None identified at eval    Goals:  Short Term Goals: 1 weeks        Goal   Status   Pt. to report decreased right shoulder pain </= 6/10 at worst to increase tolerance for performing overhead reaching   Goal met 5/8/2023     Pt. to demonstrate proper cervical and scapula retraction requiring minimum verbal cues from PT to perform Goal met 5/8/2023      Pt. to demonstrate an increase in right shoulder elevation AROM to >100 deg. to promote greater ease with lifting tasks.  Goal met  5/8/2023       Pt. to demonstrate an increase in right shoulder internal and external AROM to +5 deg. to promote greater ease self care skills  Progressing 5/8/2023      Pt to tolerate HEP to improve ROM and independence with  ADL's  Goal met 5/8/2023            Long Term Goals: 3 weeks     Goal Status    Pt. to report decreased right shoulder pain </= 2/10 at worst to increase tolerance for performing overhead reaching   Progressing 5/8/2023   Pt. to demonstrate proper cervical and scapula retraction requiring minimum verbal cues from PT to perform  Goal met 5/8/2023   Pt. to demonstrate an increase in right shoulder elevation AROM to >15 deg. to promote greater ease with lifting tasks.  Goal met 5/8/2023    Pt. to demonstrate an increase in right shoulder internal and external AROM to +10 deg. to promote greater ease self care skills   Progressing 5/8/2023   Pt. to demonstrate increased MMT for right shoulder flexion to 4/5 to improve ADL tolerance    Progressing 5/8/2023   Pt. to demonstrate increased MMT for mid-trap/lower trap strength to 4/5 to improve scapula stability during ADL and home related chores.   Progressing 5/8/2023   Pt. to demonstrate increased MMT to serratus anterior to 4/5 to improve scapula stability during repetitive UE tasks   Goal met 5/8/2023   Pt to tolerate HEP to improve ROM and independence with ADL's   Progressing 5/8/2023        Plan     Continued with current POC      Chilo Baig, PT, DPT  5/8/2023

## 2023-05-09 ENCOUNTER — DOCUMENTATION ONLY (OUTPATIENT)
Dept: REHABILITATION | Facility: HOSPITAL | Age: 53
End: 2023-05-09
Payer: COMMERCIAL

## 2023-05-09 NOTE — PROGRESS NOTES
PT/PTA met face to face to discuss pt's treatment plan and progress towards established goals. Pt will be seen by a physical therapist minimally every 6th visit or every 30 days.    Please see Updated Plan of Care for changes and updated goals.       Jay Tan, PTA

## 2023-05-10 ENCOUNTER — CLINICAL SUPPORT (OUTPATIENT)
Dept: REHABILITATION | Facility: HOSPITAL | Age: 53
End: 2023-05-10
Payer: COMMERCIAL

## 2023-05-10 DIAGNOSIS — M25.611 DECREASED RANGE OF MOTION OF RIGHT SHOULDER: Primary | ICD-10-CM

## 2023-05-10 PROCEDURE — 97110 THERAPEUTIC EXERCISES: CPT | Mod: PN,CQ

## 2023-05-10 PROCEDURE — 97140 MANUAL THERAPY 1/> REGIONS: CPT | Mod: PN,CQ

## 2023-05-10 PROCEDURE — 97112 NEUROMUSCULAR REEDUCATION: CPT | Mod: PN,CQ

## 2023-05-10 NOTE — PROGRESS NOTES
Physical Therapy Daily Treatment Note     Name: Dada Jean  [facility] Number: [id number]    Therapy Diagnosis:   Encounter Diagnosis   Name Primary?    Decreased range of motion of right shoulder Yes     Physician: Brad Hager PA-C    Visit Date: 5/10/2023    Physician Orders: PT Eval and Treat   Medical Diagnosis from Referral: S46.911D (ICD-10-CM) - Strain of right shoulder, subsequent encounter  Evaluation Date: 2023  Plan of Care Expiration: 23 or 9 Visit  Authorization Period Expiration: 4/3/24  Visit # / Visits authorized:   Remaining Visits Scheduled -   PTA visit #:     Eval Visit FOTO-  2023 - 40.1  5th Visit FOTO   -  2022 - 21.07  10th Visit FOTO  -  (Date/Score/Turn Green)   D/C FOTO          -  (Date/Score/Turn Green)      Time In: 11:05 AM  Time Out: 12:00 PM  Billable Time: 55 minutes   Non-Billable Time: 00 minutes    Precautions: Standard  Insurance: Payor: GENERIC WORKERS COMP / Plan: GENERIC WORKER'S COMP / Product Type: Worker's Comp /     Subjective     Pt reports: elbow is still bothering him but shoulder is better. Overall is doing much better  He is compliant with home exercise program.  Response to previous treatment: shoulder feels the same    Pre-Treatment Pain Ratin/10  Post-Treatment Pain Ratin/10  Location: right shoulder/elbow      Objective       Dada received therapeutic exercises to develop strength, ROM, and flexibility for 25 minutes including:    Reassessment  Shrugs - 30 repititions    Scapular retractions - 40 repetitions    Self mobilization of FCU muscle origin and medial epicondyle  Self mobilization of R lat musculature with foam roller 20 repetitions  IR rotation stretch on door for scapular stabilization 10'' hold x10  Shoulder scaption to shoulder level no added weight 3x10  Standing rows w/blue TB 3x10  passive range of motion flexion in standing - X2'   Cervical rotation, lateral flexion and flex/ext - 30 repititions     Table slides flexion, abduction , scaption - 30 repititions    Supine Dowel flexion 2x10   Supine dowel abduction 2x10  Supine ER stretch 2x10 5s hold  Prayer stretch 3x30s  Corner stretch - 1.5'  Stading door way external rotation  stretch - 1.5' (attempted but too painful)  Sleeper stretch - 1.5  Deltoid / shoulder flexion - 30 repetitions   Supine pec stretch 1/2 bolster - 4'  GTB IR/ER 3x10     Dada participated in neuromuscular re-education activities to improve: Coordination, Kinesthetic, and Proprioception for 20 minutes. The following activities were included:  Isometric ER/IR/flexion/abduction 2x10 (more pain with IR)  Prone T's 3x10  hold 3s  Prone I's 3x10 hold 3s  Prone Rows 3x10 4lb DB  Shoulder taps on treatment table 2x10 B sides  Axial loading through R shoulder with ball at hip height 2x10 in flexion and abduction CW/CCW  Serratus punches in supine no added weight 4x10    Wrist flexion eccentrics w/4lb DB    *Bold exercise performed       Dada received the following manual therapy techniques: Joint mobilizations and Soft tissue Mobilization were applied to the: right shoulder for 10 minutes, including:    Visit Number:  2 out of 9   Manual Therapy  (amplitude and time)     IASTM to tricep NP   PROM of shoulder all planes NP   STM (massage gun)  Done               Dry Needling Performed by Damien Santiago DPT, Cert. DN: The patient was cleared of all contraindications and educated on the risks and effects of dry needling, and post needling expectations. The patient was agreeable to dry needling treatment. Pt signed consent form pre needling. Dry Needling was performed using 0.30 x 40 mm needles to biceps brachii from anterior to posterior with electrical stimulation applied at 2 Hz for 10 minutes. No adverse affects were noted. Performed no periosteal pecking and bidirectional twisting prior to stim. - not performed         Home Exercises Provided and Patient Education Provided     Education  provided:   - Continue with HEP    Written Home Exercises Provided: Patient instructed to cont prior HEP.  Exercises were reviewed and Dada was able to demonstrate them prior to the end of the session.  Dada demonstrated fair  understanding of the education provided.     See EMR under Patient Instructions for exercises provided prior visit.    Assessment     Pt tolerated session well. Resisted ER/IR added to strengthen rotator cuff musculature. Prone T's very challenging, exercise modified with elbows at 90 degrees to improve scapular retraction, tactile cues also necessary. Prone Ws attempted, shoulder ER during exercise caused pain, exercise stopped. Massage gun used on tricep, wrist extensors/flexors, pt stated this loosened elbow and helped reduce discomfort. Dada had no adverse effects with exercises today and will continue to benefit from skilled therapy.     Dada Is progressing well towards His goals.     Pt prognosis is Good.     Pt will continue to benefit from skilled outpatient physical therapy to address the deficits listed in the problem list box on initial evaluation, provide pt/family education and to maximize pt's level of independence in the home and community environment.     Pt's spiritual, cultural and educational needs considered and pt agreeable to plan of care and goals.    Anticipated barriers to physical therapy: None identified at eval    Goals:  Short Term Goals: 1 weeks        Goal   Status   Pt. to report decreased right shoulder pain </= 6/10 at worst to increase tolerance for performing overhead reaching   Goal met 5/8/2023     Pt. to demonstrate proper cervical and scapula retraction requiring minimum verbal cues from PT to perform Goal met 5/8/2023      Pt. to demonstrate an increase in right shoulder elevation AROM to >100 deg. to promote greater ease with lifting tasks.  Goal met  5/8/2023       Pt. to demonstrate an increase in right shoulder internal and external AROM to +5  deg. to promote greater ease self care skills  Progressing 5/10/2023      Pt to tolerate HEP to improve ROM and independence with ADL's  Goal met 5/8/2023            Long Term Goals: 3 weeks     Goal Status    Pt. to report decreased right shoulder pain </= 2/10 at worst to increase tolerance for performing overhead reaching   Progressing 5/8/2023   Pt. to demonstrate proper cervical and scapula retraction requiring minimum verbal cues from PT to perform  Goal met 5/8/2023   Pt. to demonstrate an increase in right shoulder elevation AROM to >15 deg. to promote greater ease with lifting tasks.  Goal met 5/8/2023    Pt. to demonstrate an increase in right shoulder internal and external AROM to +10 deg. to promote greater ease self care skills   Progressing 5/8/2023   Pt. to demonstrate increased MMT for right shoulder flexion to 4/5 to improve ADL tolerance    Progressing 5/8/2023   Pt. to demonstrate increased MMT for mid-trap/lower trap strength to 4/5 to improve scapula stability during ADL and home related chores.   Progressing 5/8/2023   Pt. to demonstrate increased MMT to serratus anterior to 4/5 to improve scapula stability during repetitive UE tasks   Goal met 5/8/2023   Pt to tolerate HEP to improve ROM and independence with ADL's   Progressing 5/8/2023        Plan     Continued with current POC      Jay Tan PTA,   5/10/2023

## 2023-05-12 ENCOUNTER — PATIENT MESSAGE (OUTPATIENT)
Dept: REHABILITATION | Facility: HOSPITAL | Age: 53
End: 2023-05-12
Payer: COMMERCIAL

## 2023-05-16 ENCOUNTER — OFFICE VISIT (OUTPATIENT)
Dept: URGENT CARE | Facility: CLINIC | Age: 53
End: 2023-05-16
Payer: COMMERCIAL

## 2023-05-16 VITALS
OXYGEN SATURATION: 97 % | HEART RATE: 61 BPM | TEMPERATURE: 98 F | DIASTOLIC BLOOD PRESSURE: 85 MMHG | SYSTOLIC BLOOD PRESSURE: 137 MMHG

## 2023-05-16 DIAGNOSIS — S46.911D STRAIN OF RIGHT SHOULDER, SUBSEQUENT ENCOUNTER: Primary | ICD-10-CM

## 2023-05-16 DIAGNOSIS — Z02.6 ENCOUNTER RELATED TO WORKER'S COMPENSATION CLAIM: ICD-10-CM
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
## 2023-05-16 PROCEDURE — 99213 PR OFFICE/OUTPT VISIT, EST, LEVL III, 20-29 MIN: ICD-10-PCS | Mod: S$GLB,,, | Performed by: PHYSICIAN ASSISTANT

## 2023-05-16 PROCEDURE — 99213 OFFICE O/P EST LOW 20 MIN: CPT | Mod: S$GLB,,, | Performed by: PHYSICIAN ASSISTANT

## 2023-05-16 NOTE — PROGRESS NOTES
Subjective:      Patient ID: Dada Jean is a 52 y.o. male.    Chief Complaint: Shoulder Injury (RIGHT SHOULDER)    KW  Patient's place of employment - [employer]  Patient's job title -   Date of Injury - 03/22/2023  Body part injured - RIGHT SHOULDER  Current work status per last visit - LIGHT  Improved, same, or worse - SAME  Pain Scale right now (1-10) -  4      Patient presents follow-up right shoulder strain.  He reports improvement with physical therapy.  Reports pain is worse with activity.  Denies numbness or tingling or neck pain.  Says his elbow started to act up. reporting pain to the medial aspect. M    Shoulder Injury   Pertinent negatives include no numbness.   Constitution: Positive for activity change.   Musculoskeletal:  Positive for pain and joint pain.   Neurological:  Negative for numbness and tingling.   Objective:     Physical Exam  Vitals and nursing note reviewed.   Constitutional:       General: He is not in acute distress.     Appearance: He is well-developed. He is not diaphoretic.   HENT:      Head: Normocephalic and atraumatic.      Right Ear: Hearing and external ear normal.      Left Ear: Hearing and external ear normal.      Nose: Nose normal. No nasal deformity.   Eyes:      General: Lids are normal. No scleral icterus.     Conjunctiva/sclera: Conjunctivae normal.   Neck:      Trachea: Trachea normal.   Cardiovascular:      Pulses: Normal pulses.   Pulmonary:      Effort: Pulmonary effort is normal. No respiratory distress.      Breath sounds: No stridor.   Musculoskeletal:      Right shoulder: Tenderness present. No swelling, deformity or crepitus. Decreased range of motion. Normal strength. Normal pulse.        Arms:       Cervical back: Normal range of motion.      Comments: Right shoulder:  AROM flexion 180 degrees, abduction 180 degrees, ER 90 degrees, IR L3 (L T5).  O'Calvin Bryant negative  Speed's positive   Skin:     General: Skin is warm and dry.      Capillary  Refill: Capillary refill takes less than 2 seconds.   Neurological:      Mental Status: He is alert. He is not disoriented.      GCS: GCS eye subscore is 4. GCS verbal subscore is 5. GCS motor subscore is 6.      Sensory: No sensory deficit.   Psychiatric:         Attention and Perception: He is attentive.         Speech: Speech normal.         Behavior: Behavior normal.      Assessment:      1. Strain of right shoulder, subsequent encounter    2. Encounter related to worker's compensation claim    3. Work related injury      Plan:       Patient appears to have biceps tendonitis based on physical exam.  Patient to continue physical therapy, home exercises and diclofenac gel.  I will see patient back in 1 month or sooner as needed.  Consider MRI if no significant improvement at next office visit.  Patient verbalized understanding and agreement.       Patient Instructions: Attention not to aggravate affected area, Continue Physical Therapy, Daily home exercises/warm soaks   Restrictions: Regular Duty  Follow up in about 4 weeks (around 6/13/2023).

## 2023-05-16 NOTE — LETTER
Urgent Care - [address] [address]  Phone: [phone]  Fax: [phone]  [facility]: [phone]    Pt Name: Dada Jean  Injury Date: 03/22/2023   Employee ID: [id number] Date of Treatment: 05/16/2023   Company:[employer]      Appointment Time: 08:15 AM Arrived: 8:05AM   Provider: Brad Hager PA-C Time Out:9:00AM     Office Treatment:   1. Strain of right shoulder, subsequent encounter    2. Encounter related to worker's compensation claim    3. Work related injury          Patient Instructions: Attention not to aggravate affected area, Continue Physical Therapy, Daily home exercises/warm soaks    Restrictions: Regular Duty     Return Appointment: 6/13/2023 at 8:30AM    KW

## 2023-06-01 ENCOUNTER — DOCUMENTATION ONLY (OUTPATIENT)
Dept: REHABILITATION | Facility: HOSPITAL | Age: 53
End: 2023-06-01
Payer: COMMERCIAL

## 2023-06-14 ENCOUNTER — TELEPHONE (OUTPATIENT)
Dept: URGENT CARE | Facility: CLINIC | Age: 53
End: 2023-06-14
Payer: COMMERCIAL

## 2023-06-14 NOTE — TELEPHONE ENCOUNTER
Called patient and I was unable to leave a message and call back number regarding [facility] missed appointment. Patients mail box is full and can not leave a message at this time.

## 2023-06-22 ENCOUNTER — PATIENT OUTREACH (OUTPATIENT)
Dept: ADMINISTRATIVE | Facility: HOSPITAL | Age: 53
End: 2023-06-22
Payer: COMMERCIAL

## 2023-07-06 ENCOUNTER — LAB VISIT (OUTPATIENT)
Dept: LAB | Facility: OTHER | Age: 53
End: 2023-07-06
Attending: INTERNAL MEDICINE
Payer: COMMERCIAL

## 2023-07-06 ENCOUNTER — OFFICE VISIT (OUTPATIENT)
Dept: INTERNAL MEDICINE | Facility: CLINIC | Age: 53
End: 2023-07-06
Attending: INTERNAL MEDICINE
Payer: COMMERCIAL

## 2023-07-06 VITALS
BODY MASS INDEX: 30.38 KG/M2 | HEIGHT: 71 IN | DIASTOLIC BLOOD PRESSURE: 80 MMHG | WEIGHT: 217 LBS | HEART RATE: 64 BPM | SYSTOLIC BLOOD PRESSURE: 122 MMHG | OXYGEN SATURATION: 97 %

## 2023-07-06 DIAGNOSIS — Z00.00 ROUTINE ADULT HEALTH MAINTENANCE: ICD-10-CM

## 2023-07-06 DIAGNOSIS — D51.0 PERNICIOUS ANEMIA: ICD-10-CM

## 2023-07-06 DIAGNOSIS — E78.9 DISORDER OF LIPID METABOLISM: ICD-10-CM

## 2023-07-06 DIAGNOSIS — Z00.00 ROUTINE ADULT HEALTH MAINTENANCE: Primary | ICD-10-CM

## 2023-07-06 DIAGNOSIS — D50.8 OTHER IRON DEFICIENCY ANEMIA: ICD-10-CM

## 2023-07-06 DIAGNOSIS — R79.89 OTHER SPECIFIED ABNORMAL FINDINGS OF BLOOD CHEMISTRY: ICD-10-CM

## 2023-07-06 DIAGNOSIS — Z12.5 SCREENING FOR PROSTATE CANCER: ICD-10-CM

## 2023-07-06 DIAGNOSIS — E78.00 HIGH CHOLESTEROL: Primary | ICD-10-CM

## 2023-07-06 DIAGNOSIS — E03.9 HYPOTHYROIDISM, UNSPECIFIED TYPE: ICD-10-CM

## 2023-07-06 DIAGNOSIS — E55.9 VITAMIN D DEFICIENCY: ICD-10-CM

## 2023-07-06 LAB
25(OH)D3+25(OH)D2 SERPL-MCNC: 52 NG/ML (ref 30–96)
ALBUMIN SERPL BCP-MCNC: 4.3 G/DL (ref 3.5–5.2)
ALP SERPL-CCNC: 52 U/L (ref 55–135)
ALT SERPL W/O P-5'-P-CCNC: 16 U/L (ref 10–44)
ANION GAP SERPL CALC-SCNC: 7 MMOL/L (ref 8–16)
AST SERPL-CCNC: 16 U/L (ref 10–40)
BASOPHILS # BLD AUTO: 0.04 K/UL (ref 0–0.2)
BASOPHILS NFR BLD: 1.2 % (ref 0–1.9)
BILIRUB SERPL-MCNC: 0.7 MG/DL (ref 0.1–1)
BUN SERPL-MCNC: 12 MG/DL (ref 6–20)
CALCIUM SERPL-MCNC: 9.5 MG/DL (ref 8.7–10.5)
CHLORIDE SERPL-SCNC: 105 MMOL/L (ref 95–110)
CHOLEST SERPL-MCNC: 178 MG/DL (ref 120–199)
CHOLEST/HDLC SERPL: 3 {RATIO} (ref 2–5)
CO2 SERPL-SCNC: 27 MMOL/L (ref 23–29)
COMPLEXED PSA SERPL-MCNC: 0.14 NG/ML (ref 0–4)
CREAT SERPL-MCNC: 1 MG/DL (ref 0.5–1.4)
DIFFERENTIAL METHOD: ABNORMAL
EOSINOPHIL # BLD AUTO: 0.2 K/UL (ref 0–0.5)
EOSINOPHIL NFR BLD: 5 % (ref 0–8)
ERYTHROCYTE [DISTWIDTH] IN BLOOD BY AUTOMATED COUNT: 12.5 % (ref 11.5–14.5)
EST. GFR  (NO RACE VARIABLE): >60 ML/MIN/1.73 M^2
ESTIMATED AVG GLUCOSE: 94 MG/DL (ref 68–131)
GLUCOSE SERPL-MCNC: 81 MG/DL (ref 70–110)
HBA1C MFR BLD: 4.9 % (ref 4–5.6)
HCT VFR BLD AUTO: 42.9 % (ref 40–54)
HDLC SERPL-MCNC: 60 MG/DL (ref 40–75)
HDLC SERPL: 33.7 % (ref 20–50)
HGB BLD-MCNC: 14.3 G/DL (ref 14–18)
IMM GRANULOCYTES # BLD AUTO: 0.01 K/UL (ref 0–0.04)
IMM GRANULOCYTES NFR BLD AUTO: 0.3 % (ref 0–0.5)
LDLC SERPL CALC-MCNC: 106.6 MG/DL (ref 63–159)
LYMPHOCYTES # BLD AUTO: 1.3 K/UL (ref 1–4.8)
LYMPHOCYTES NFR BLD: 38.5 % (ref 18–48)
MCH RBC QN AUTO: 30.2 PG (ref 27–31)
MCHC RBC AUTO-ENTMCNC: 33.3 G/DL (ref 32–36)
MCV RBC AUTO: 91 FL (ref 82–98)
MONOCYTES # BLD AUTO: 0.3 K/UL (ref 0.3–1)
MONOCYTES NFR BLD: 8 % (ref 4–15)
NEUTROPHILS # BLD AUTO: 1.6 K/UL (ref 1.8–7.7)
NEUTROPHILS NFR BLD: 47 % (ref 38–73)
NONHDLC SERPL-MCNC: 118 MG/DL
NRBC BLD-RTO: 0 /100 WBC
PLATELET # BLD AUTO: 170 K/UL (ref 150–450)
PMV BLD AUTO: 10.9 FL (ref 9.2–12.9)
POTASSIUM SERPL-SCNC: 3.7 MMOL/L (ref 3.5–5.1)
PROT SERPL-MCNC: 7 G/DL (ref 6–8.4)
RBC # BLD AUTO: 4.74 M/UL (ref 4.6–6.2)
SODIUM SERPL-SCNC: 139 MMOL/L (ref 136–145)
TRIGL SERPL-MCNC: 57 MG/DL (ref 30–150)
TSH SERPL DL<=0.005 MIU/L-ACNC: 1.51 UIU/ML (ref 0.4–4)
VIT B12 SERPL-MCNC: 202 PG/ML (ref 210–950)
WBC # BLD AUTO: 3.38 K/UL (ref 3.9–12.7)

## 2023-07-06 PROCEDURE — 82607 VITAMIN B-12: CPT | Performed by: INTERNAL MEDICINE

## 2023-07-06 PROCEDURE — 99396 PREV VISIT EST AGE 40-64: CPT | Mod: S$GLB,,, | Performed by: INTERNAL MEDICINE

## 2023-07-06 PROCEDURE — 1160F RVW MEDS BY RX/DR IN RCRD: CPT | Mod: CPTII,S$GLB,, | Performed by: INTERNAL MEDICINE

## 2023-07-06 PROCEDURE — 3079F DIAST BP 80-89 MM HG: CPT | Mod: CPTII,S$GLB,, | Performed by: INTERNAL MEDICINE

## 2023-07-06 PROCEDURE — 82306 VITAMIN D 25 HYDROXY: CPT | Performed by: INTERNAL MEDICINE

## 2023-07-06 PROCEDURE — 84153 ASSAY OF PSA TOTAL: CPT | Performed by: INTERNAL MEDICINE

## 2023-07-06 PROCEDURE — 84443 ASSAY THYROID STIM HORMONE: CPT | Performed by: INTERNAL MEDICINE

## 2023-07-06 PROCEDURE — 3079F PR MOST RECENT DIASTOLIC BLOOD PRESSURE 80-89 MM HG: ICD-10-PCS | Mod: CPTII,S$GLB,, | Performed by: INTERNAL MEDICINE

## 2023-07-06 PROCEDURE — 3008F BODY MASS INDEX DOCD: CPT | Mod: CPTII,S$GLB,, | Performed by: INTERNAL MEDICINE

## 2023-07-06 PROCEDURE — 80053 COMPREHEN METABOLIC PANEL: CPT | Performed by: INTERNAL MEDICINE

## 2023-07-06 PROCEDURE — 36415 COLL VENOUS BLD VENIPUNCTURE: CPT | Performed by: INTERNAL MEDICINE

## 2023-07-06 PROCEDURE — 3074F SYST BP LT 130 MM HG: CPT | Mod: CPTII,S$GLB,, | Performed by: INTERNAL MEDICINE

## 2023-07-06 PROCEDURE — 83036 HEMOGLOBIN GLYCOSYLATED A1C: CPT | Performed by: INTERNAL MEDICINE

## 2023-07-06 PROCEDURE — 1160F PR REVIEW ALL MEDS BY PRESCRIBER/CLIN PHARMACIST DOCUMENTED: ICD-10-PCS | Mod: CPTII,S$GLB,, | Performed by: INTERNAL MEDICINE

## 2023-07-06 PROCEDURE — 85025 COMPLETE CBC W/AUTO DIFF WBC: CPT | Performed by: INTERNAL MEDICINE

## 2023-07-06 PROCEDURE — 80061 LIPID PANEL: CPT | Performed by: INTERNAL MEDICINE

## 2023-07-06 PROCEDURE — 1159F PR MEDICATION LIST DOCUMENTED IN MEDICAL RECORD: ICD-10-PCS | Mod: CPTII,S$GLB,, | Performed by: INTERNAL MEDICINE

## 2023-07-06 PROCEDURE — 99396 PR PREVENTIVE VISIT,EST,40-64: ICD-10-PCS | Mod: S$GLB,,, | Performed by: INTERNAL MEDICINE

## 2023-07-06 PROCEDURE — 3074F PR MOST RECENT SYSTOLIC BLOOD PRESSURE < 130 MM HG: ICD-10-PCS | Mod: CPTII,S$GLB,, | Performed by: INTERNAL MEDICINE

## 2023-07-06 PROCEDURE — 3008F PR BODY MASS INDEX (BMI) DOCUMENTED: ICD-10-PCS | Mod: CPTII,S$GLB,, | Performed by: INTERNAL MEDICINE

## 2023-07-06 PROCEDURE — 1159F MED LIST DOCD IN RCRD: CPT | Mod: CPTII,S$GLB,, | Performed by: INTERNAL MEDICINE

## 2023-07-06 NOTE — PROGRESS NOTES
Subjective     Patient ID: Dada Jean is a 52 y.o. male.    Chief Complaint: Annual Exam and Hyperlipidemia    He started taking Mounjaro in September of 2022 and has lost 32 pounds.  He is currently taking 7.5 mg every 14 days and maintaining his current weight.      Adult Wellness Exam:    Mental Conditions: None  Depression Risk Factors: None  BMI: See Vital signs   Colon screen:    See Health Maintenance Report                                    Vaccines (Flu, Adacel, Shingrix): See Health Maintenance Report  Routine labs (Cholesterol, Glucose/Hgb A1C, and TSH): ordered.     The patient's current health status is: Good   Patient was educated on routine health maintenance. See Patient Instructions.                               Hyperlipidemia    Review of Systems   Constitutional: Negative.    Respiratory: Negative.     Cardiovascular: Negative.         Objective     Physical Exam  Vitals and nursing note reviewed.   Constitutional:       Appearance: He is well-developed.   HENT:      Head: Normocephalic and atraumatic.   Eyes:      Pupils: Pupils are equal, round, and reactive to light.   Cardiovascular:      Rate and Rhythm: Normal rate and regular rhythm.      Heart sounds: Normal heart sounds.   Pulmonary:      Effort: Pulmonary effort is normal.   Neurological:      Mental Status: He is alert.          Assessment and Plan     1. High cholesterol    2. Routine adult health maintenance        Per orders and D/C instructions.  Check routine labs for high cholesterol.  He wants to continue Lexapro 10 mg daily.         Follow up in about 1 year (around 7/6/2024).

## 2023-07-06 NOTE — PATIENT INSTRUCTIONS
Tips for Healthy Living and Routine Preventative Care - 2023                                                            (These guidelines are intended for healthy adults)      1. Exercise  Exercise aerobically with a target heart rate of (220-age) x 0.8  Exercise 30-45 minutes on most days of the week    2. Diet and Supplements- All supplements can be obtained through a varied, healthy diet   Calcium: 1,000 - 1,200 mg each day   8 oz milk or Calcium fortified O.J. = 300, 8 oz Yogurt = 400 mg, 1 oz of cheese =100-200 mg              8 oz Oatmeal = 215 mg, 3 oz [place] = 240 mg  Vitamin D: 800 iu each day- Can probably be obtained by 30 min. of direct sunlight    each day             3 oz. [place] = 800 iu,  3 oz. Tuna =150 iu, Milk or fortified O.J. = 120 iu  Fish oil: 1-2 grams each day or about 840 mg of EPA and DHA (Omega-3 fatty acids) each day             3 oz. [place]=2 grams,  3 oz. Tuna=1.3 grams,  3 oz. drained light Tuna= 0.25 grams  Folic acid 800 mcg each day for all women planning or capable of pregnancy    3. Lifestyle  Alcohol: 1 drink = 12 oz. domestic beer, 4 oz. wine, or 1 oz. hard (80 proof) liquor             Males: </= 14 drinks per week with no more than 4 in any one day             Females: </= 7 drinks per week with no more than 3 in any one day  Salt: 1.2 - 3 grams of Sodium each day.  Tobacco: Dont smoke, or quit smoking (discuss with your doctor)  Depression: If you feel depressed discuss with your doctor  Weight: Maintain a healthy body weight. Stay within 10% of:             Males: 106 lbs. + 6 lbs per inch height above 5 feet             Females: 100 lbs + 5 lbs per inch height above 5 feet    4. Routine tests  Blood pressure check at each visit, or at least once each year  HIV screening (one time) if less than 65 years old  Hepatitis C screen (one time) if less than 80 years old  Cholesterol screening every 3 years starting at age 21  Glucose/Hemaglobin A1C check every 3 years starting at  age 35.  TSH (thyroid screen) every 2 years starting at age 50  Colonoscopy at age 45, and repeat every 10 years until age 75. Consider screening until age 85. DNA stool test (Cologuard) every 3 years is an acceptable alternative.  Vision screen at age 65    Females:  Gyn exam with cervical HPV test every 3 years or Pap smear every three years starting at age 25                  Stop screening at age 65 if past 3 exams were normal                  No screening for women who have had a hysterectomy with removal of cervix  Mammogram every 1-2 years starting at age 40 until age 75  Consider continuing Mammograms every other year for those older than 75 with a life expectancy of more than 10 years  Bone density scan at about age 65    Males:  PSA screening annually at age 50, age 45 for  Americans, until age 75. Consider annual screening after age 75                   5. Immunizations  Influenza vaccine every year in the fall, especially if >50 or with a chronic disease  Tetanus/Diphtheria/Pertussis (Tdap) vaccine once (after the age of 18), then Tetanus/Diphtheria (Td) or Tdap vaccine every 10 years  Shingles (Shingrix) vaccine after age 50 and get a 2nd dose after 2-6 months  Pneumonia vaccine (Prevnar-20) at age 65       6. Advanced Directive/End of life care planning  You should consider having a signed document which informs physicians and family of your end of life care wishes.  You can go to [email or web address]. Under Step 1 click download AdobePDF and print.  This is the [place] physician order for scope of Treatment (LaPost) form.  You can also request a blank copy of the [employer]Post form from my office.  Bring a copy of the signed document to my office so we can scan it in your medical chart.

## 2023-07-07 PROBLEM — E53.8 VITAMIN B12 DEFICIENCY: Status: ACTIVE | Noted: 2023-07-07

## 2023-08-26 DIAGNOSIS — F41.9 ANXIETY: ICD-10-CM

## 2023-08-27 RX ORDER — ESCITALOPRAM OXALATE 10 MG/1
TABLET ORAL
Qty: 90 TABLET | Refills: 3 | Status: SHIPPED | OUTPATIENT
Start: 2023-08-27

## 2024-02-14 ENCOUNTER — PATIENT OUTREACH (OUTPATIENT)
Dept: ADMINISTRATIVE | Facility: HOSPITAL | Age: 54
End: 2024-02-14
Payer: COMMERCIAL

## 2024-07-10 ENCOUNTER — OFFICE VISIT (OUTPATIENT)
Dept: INTERNAL MEDICINE | Facility: CLINIC | Age: 54
End: 2024-07-10
Attending: INTERNAL MEDICINE
Payer: COMMERCIAL

## 2024-07-10 ENCOUNTER — LAB VISIT (OUTPATIENT)
Dept: LAB | Facility: OTHER | Age: 54
End: 2024-07-10
Attending: INTERNAL MEDICINE
Payer: COMMERCIAL

## 2024-07-10 VITALS
SYSTOLIC BLOOD PRESSURE: 126 MMHG | DIASTOLIC BLOOD PRESSURE: 84 MMHG | HEIGHT: 71 IN | WEIGHT: 215 LBS | OXYGEN SATURATION: 98 % | HEART RATE: 59 BPM | BODY MASS INDEX: 30.1 KG/M2

## 2024-07-10 DIAGNOSIS — E78.9 DISORDER OF LIPID METABOLISM: ICD-10-CM

## 2024-07-10 DIAGNOSIS — D50.8 OTHER IRON DEFICIENCY ANEMIA: ICD-10-CM

## 2024-07-10 DIAGNOSIS — E78.2 MODERATE MIXED HYPERLIPIDEMIA NOT REQUIRING STATIN THERAPY: Primary | ICD-10-CM

## 2024-07-10 DIAGNOSIS — D70.0 CONGENITAL NEUTROPENIA: ICD-10-CM

## 2024-07-10 DIAGNOSIS — D51.0 PERNICIOUS ANEMIA: ICD-10-CM

## 2024-07-10 DIAGNOSIS — Z12.5 SCREENING FOR PROSTATE CANCER: ICD-10-CM

## 2024-07-10 DIAGNOSIS — Z00.00 ROUTINE ADULT HEALTH MAINTENANCE: Primary | ICD-10-CM

## 2024-07-10 DIAGNOSIS — Z00.00 ROUTINE ADULT HEALTH MAINTENANCE: ICD-10-CM

## 2024-07-10 DIAGNOSIS — R07.9 CHEST PAIN, UNSPECIFIED TYPE: ICD-10-CM

## 2024-07-10 DIAGNOSIS — E55.9 VITAMIN D DEFICIENCY: ICD-10-CM

## 2024-07-10 DIAGNOSIS — R03.0 BLOOD PRESSURE ELEVATED WITHOUT HISTORY OF HTN: ICD-10-CM

## 2024-07-10 DIAGNOSIS — E03.9 HYPOTHYROIDISM, UNSPECIFIED TYPE: ICD-10-CM

## 2024-07-10 DIAGNOSIS — R79.89 OTHER SPECIFIED ABNORMAL FINDINGS OF BLOOD CHEMISTRY: ICD-10-CM

## 2024-07-10 LAB
25(OH)D3+25(OH)D2 SERPL-MCNC: 47 NG/ML (ref 30–96)
ALBUMIN SERPL BCP-MCNC: 4.1 G/DL (ref 3.5–5.2)
ALP SERPL-CCNC: 53 U/L (ref 55–135)
ALT SERPL W/O P-5'-P-CCNC: 21 U/L (ref 10–44)
ANION GAP SERPL CALC-SCNC: 6 MMOL/L (ref 8–16)
AST SERPL-CCNC: 20 U/L (ref 10–40)
BASOPHILS # BLD AUTO: 0.04 K/UL (ref 0–0.2)
BASOPHILS NFR BLD: 1.2 % (ref 0–1.9)
BILIRUB SERPL-MCNC: 0.6 MG/DL (ref 0.1–1)
BUN SERPL-MCNC: 15 MG/DL (ref 6–20)
CALCIUM SERPL-MCNC: 9.3 MG/DL (ref 8.7–10.5)
CHLORIDE SERPL-SCNC: 104 MMOL/L (ref 95–110)
CHOLEST SERPL-MCNC: 180 MG/DL (ref 120–199)
CHOLEST/HDLC SERPL: 2.7 {RATIO} (ref 2–5)
CO2 SERPL-SCNC: 30 MMOL/L (ref 23–29)
COMPLEXED PSA SERPL-MCNC: 0.12 NG/ML (ref 0–4)
CREAT SERPL-MCNC: 1 MG/DL (ref 0.5–1.4)
DIFFERENTIAL METHOD BLD: ABNORMAL
EOSINOPHIL # BLD AUTO: 0.2 K/UL (ref 0–0.5)
EOSINOPHIL NFR BLD: 5.9 % (ref 0–8)
ERYTHROCYTE [DISTWIDTH] IN BLOOD BY AUTOMATED COUNT: 12.4 % (ref 11.5–14.5)
EST. GFR  (NO RACE VARIABLE): >60 ML/MIN/1.73 M^2
ESTIMATED AVG GLUCOSE: 97 MG/DL (ref 68–131)
GLUCOSE SERPL-MCNC: 88 MG/DL (ref 70–110)
HBA1C MFR BLD: 5 % (ref 4–5.6)
HCT VFR BLD AUTO: 41.2 % (ref 40–54)
HDLC SERPL-MCNC: 67 MG/DL (ref 40–75)
HDLC SERPL: 37.2 % (ref 20–50)
HGB BLD-MCNC: 13.7 G/DL (ref 14–18)
IMM GRANULOCYTES # BLD AUTO: 0 K/UL (ref 0–0.04)
IMM GRANULOCYTES NFR BLD AUTO: 0 % (ref 0–0.5)
LDLC SERPL CALC-MCNC: 102.6 MG/DL (ref 63–159)
LYMPHOCYTES # BLD AUTO: 1.3 K/UL (ref 1–4.8)
LYMPHOCYTES NFR BLD: 38.9 % (ref 18–48)
MCH RBC QN AUTO: 30.1 PG (ref 27–31)
MCHC RBC AUTO-ENTMCNC: 33.3 G/DL (ref 32–36)
MCV RBC AUTO: 91 FL (ref 82–98)
MONOCYTES # BLD AUTO: 0.3 K/UL (ref 0.3–1)
MONOCYTES NFR BLD: 9.2 % (ref 4–15)
NEUTROPHILS # BLD AUTO: 1.5 K/UL (ref 1.8–7.7)
NEUTROPHILS NFR BLD: 44.8 % (ref 38–73)
NONHDLC SERPL-MCNC: 113 MG/DL
NRBC BLD-RTO: 0 /100 WBC
PLATELET # BLD AUTO: 158 K/UL (ref 150–450)
PMV BLD AUTO: 10.7 FL (ref 9.2–12.9)
POTASSIUM SERPL-SCNC: 3.8 MMOL/L (ref 3.5–5.1)
PROT SERPL-MCNC: 7 G/DL (ref 6–8.4)
RBC # BLD AUTO: 4.55 M/UL (ref 4.6–6.2)
SODIUM SERPL-SCNC: 140 MMOL/L (ref 136–145)
TRIGL SERPL-MCNC: 52 MG/DL (ref 30–150)
TSH SERPL DL<=0.005 MIU/L-ACNC: 1.94 UIU/ML (ref 0.4–4)
VIT B12 SERPL-MCNC: >2000 PG/ML (ref 210–950)
WBC # BLD AUTO: 3.37 K/UL (ref 3.9–12.7)

## 2024-07-10 PROCEDURE — 3008F BODY MASS INDEX DOCD: CPT | Mod: CPTII,S$GLB,, | Performed by: INTERNAL MEDICINE

## 2024-07-10 PROCEDURE — 36415 COLL VENOUS BLD VENIPUNCTURE: CPT | Performed by: INTERNAL MEDICINE

## 2024-07-10 PROCEDURE — 99396 PREV VISIT EST AGE 40-64: CPT | Mod: S$GLB,,, | Performed by: INTERNAL MEDICINE

## 2024-07-10 PROCEDURE — 85025 COMPLETE CBC W/AUTO DIFF WBC: CPT | Performed by: INTERNAL MEDICINE

## 2024-07-10 PROCEDURE — 82306 VITAMIN D 25 HYDROXY: CPT | Performed by: INTERNAL MEDICINE

## 2024-07-10 PROCEDURE — 82607 VITAMIN B-12: CPT | Performed by: INTERNAL MEDICINE

## 2024-07-10 PROCEDURE — 3079F DIAST BP 80-89 MM HG: CPT | Mod: CPTII,S$GLB,, | Performed by: INTERNAL MEDICINE

## 2024-07-10 PROCEDURE — 84153 ASSAY OF PSA TOTAL: CPT | Performed by: INTERNAL MEDICINE

## 2024-07-10 PROCEDURE — 80061 LIPID PANEL: CPT | Performed by: INTERNAL MEDICINE

## 2024-07-10 PROCEDURE — 3074F SYST BP LT 130 MM HG: CPT | Mod: CPTII,S$GLB,, | Performed by: INTERNAL MEDICINE

## 2024-07-10 PROCEDURE — 80053 COMPREHEN METABOLIC PANEL: CPT | Performed by: INTERNAL MEDICINE

## 2024-07-10 PROCEDURE — 1159F MED LIST DOCD IN RCRD: CPT | Mod: CPTII,S$GLB,, | Performed by: INTERNAL MEDICINE

## 2024-07-10 PROCEDURE — 84443 ASSAY THYROID STIM HORMONE: CPT | Performed by: INTERNAL MEDICINE

## 2024-07-10 PROCEDURE — 83036 HEMOGLOBIN GLYCOSYLATED A1C: CPT | Performed by: INTERNAL MEDICINE

## 2024-07-10 PROCEDURE — 1160F RVW MEDS BY RX/DR IN RCRD: CPT | Mod: CPTII,S$GLB,, | Performed by: INTERNAL MEDICINE

## 2024-07-10 NOTE — PROGRESS NOTES
Subjective     Patient ID: Dada Jean is a 53 y.o. male.    Chief Complaint: Annual Exam (Recently had some test on his heart and was told he has mild build up in arteries, tightness in chest at times ) and Hyperlipidemia    Occasionally he gets some mild squeezing chest pain.  It does not last long.  It is not related to activity.  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  He was mildly short of breath but got no chest pain.        Adult Wellness Exam:    Mental Conditions: None  Depression Risk Factors: None  BMI: See Vital signs   Colon screen:    See Health Maintenance Report                                      Vaccines (Flu, Adacel, Shingrix): See Health Maintenance Report  Routine labs (Cholesterol, Glucose/Hgb A1C, and TSH): ordered.     The patient's current health status is: Good   Patient was educated on routine health maintenance. See Patient Instructions.                             Hyperlipidemia  This is a chronic problem. The current episode started more than 1 year ago. The problem is controlled. Associated symptoms include chest pain.     Review of Systems   Constitutional: Negative.    Respiratory: Negative.     Cardiovascular:  Positive for chest pain.          Objective     Physical Exam  Vitals and nursing note reviewed.   Constitutional:       Appearance: He is well-developed.   HENT:      Head: Normocephalic and atraumatic.   Eyes:      Pupils: Pupils are equal, round, and reactive to light.   Cardiovascular:      Rate and Rhythm: Normal rate and regular rhythm.      Heart sounds: Normal heart sounds.   Pulmonary:      Effort: Pulmonary effort is normal.   Neurological:      Mental Status: He is alert.            Assessment and Plan     1. Moderate mixed hyperlipidemia not requiring statin therapy    2. Congenital neutropenia  Overview:  HIV neg - 6/20      3. Routine adult health maintenance    4. Chest pain, unspecified type        Plan:  Per orders and D/C  instructions.  Continue a diet low in saturated fat for high cholesterol, which is stable.  Check routine labs for congenital neutropenia, which is stable.  He will monitor the chest pain.  If it becomes more intense or more frequent we will consider doing another stress echo.  He will follow a low-sodium diet for borderline high blood pressure.           Follow up in about 1 year (around 7/10/2025).

## 2024-07-10 NOTE — PATIENT INSTRUCTIONS
Tips for Healthy Living and Routine Preventative Care - 2024                                                            (These guidelines are intended for healthy adults)      1. Exercise  Exercise aerobically with a target heart rate of (220-age) x 0.8  Exercise 30-45 minutes on most days of the week    2. Diet and Supplements- All supplements can be obtained through a varied, healthy diet   Calcium: 1,000 - 1,200 mg each day   8 oz milk or Calcium fortified O.J. = 300, 8 oz Yogurt = 400 mg, 1 oz of cheese =100-200 mg              8 oz Oatmeal = 215 mg, 3 oz [place] = 240 mg  Vitamin D: 800 iu each day- Can probably be obtained by 30 min. of direct sunlight    each day             3 oz. [place] = 800 iu,  3 oz. Tuna =150 iu, Milk or fortified O.J. = 120 iu  Fish oil: 1-2 grams each day or about 840 mg of EPA and DHA (Omega-3 fatty acids) each day             3 oz. [place]=2 grams,  3 oz. Tuna=1.3 grams,  3 oz. drained light Tuna= 0.25 grams  Folic acid 800 mcg each day for all women planning or capable of pregnancy    3. Lifestyle  Alcohol: 1 drink = 12 oz. domestic beer, 4 oz. wine, or 1 oz. hard (80 proof) liquor             Males: </= 14 drinks per week with no more than 4 in any one day             Females: </= 7 drinks per week with no more than 3 in any one day  Salt: 1.2 - 3 grams of Sodium each day.  Tobacco: Dont smoke, or quit smoking (discuss with your doctor)  Depression: If you feel depressed discuss with your doctor  Weight: Maintain a healthy body weight. Stay within 10% of:             Males: 106 lbs. + 6 lbs per inch height above 5 feet             Females: 100 lbs + 5 lbs per inch height above 5 feet    4. Routine tests  Blood pressure check at each visit, or at least once each year  HIV screening (one time) if less than 65 years old  Hepatitis C screen (one time) if less than 80 years old  Cholesterol screening every 3 years starting at age 21  Glucose/Hemaglobin A1C check every 3 years starting at  age 35.  TSH (thyroid screen) every 2 years starting at age 50  Colonoscopy at age 45, and repeat every 10 years until age 75. Consider screening until age 85. DNA stool test (Cologuard) every 3 years is an acceptable alternative.  Vision screen at age 65    Females:  Gyn exam with cervical HPV test every 3 years or Pap smear every three years starting at age 25                  Stop screening at age 65 if past 3 exams were normal                  No screening for women who have had a hysterectomy with removal of cervix  Mammogram every 1-2 years starting at age 40 until age 75  Consider continuing Mammograms every other year for those older than 75 with a life expectancy of more than 10 years  Bone density scan at about age 65    Males:  PSA screening annually at age 50, age 45 for  Americans, until age 75. Consider annual screening after age 75                   5. Immunizations  Influenza vaccine every year in the fall, especially if >50 or with a chronic disease  Consider getting a Covid booster vaccine annually  Tetanus/Diphtheria/Pertussis (Tdap) vaccine once (after the age of 18), then Tetanus/Diphtheria (Td) or Tdap vaccine every 10 years  Shingles (Shingrix) vaccine after age 50 and get a 2nd dose after 2-6 months  RSV (respiratory syncytial virus) vaccine after age 60  Pneumonia vaccine (Prevnar-20) at age 65       6. Advanced Directive/End of life care planning  You should consider having a signed document which informs physicians and family of your end of life care wishes.  You can go to [employer]/DNR/[place]. Under Step 1 click download AdobePDF and print.  This is the [place] physician order for scope of Treatment (LaPost) form.  You can also request a blank copy of the [employer]Post form from my office.  Bring a copy of the signed document to my office so we can scan it in your medical chart.

## 2024-10-06 DIAGNOSIS — F41.9 ANXIETY: ICD-10-CM

## 2024-10-06 RX ORDER — ESCITALOPRAM OXALATE 10 MG/1
TABLET ORAL
Qty: 90 TABLET | Refills: 3 | Status: SHIPPED | OUTPATIENT
Start: 2024-10-06

## 2025-07-11 ENCOUNTER — LAB VISIT (OUTPATIENT)
Dept: LAB | Facility: OTHER | Age: 55
End: 2025-07-11
Attending: INTERNAL MEDICINE
Payer: COMMERCIAL

## 2025-07-11 ENCOUNTER — OFFICE VISIT (OUTPATIENT)
Dept: INTERNAL MEDICINE | Facility: CLINIC | Age: 55
End: 2025-07-11
Attending: INTERNAL MEDICINE
Payer: COMMERCIAL

## 2025-07-11 VITALS
SYSTOLIC BLOOD PRESSURE: 120 MMHG | OXYGEN SATURATION: 97 % | DIASTOLIC BLOOD PRESSURE: 82 MMHG | HEIGHT: 71 IN | BODY MASS INDEX: 29.96 KG/M2 | WEIGHT: 214 LBS | HEART RATE: 66 BPM

## 2025-07-11 DIAGNOSIS — R79.89 OTHER SPECIFIED ABNORMAL FINDINGS OF BLOOD CHEMISTRY: ICD-10-CM

## 2025-07-11 DIAGNOSIS — E03.9 HYPOTHYROIDISM, UNSPECIFIED TYPE: ICD-10-CM

## 2025-07-11 DIAGNOSIS — D50.8 OTHER IRON DEFICIENCY ANEMIA: ICD-10-CM

## 2025-07-11 DIAGNOSIS — D70.0 CONGENITAL NEUTROPENIA: ICD-10-CM

## 2025-07-11 DIAGNOSIS — D51.0 PERNICIOUS ANEMIA: ICD-10-CM

## 2025-07-11 DIAGNOSIS — Z12.5 SCREENING FOR PROSTATE CANCER: ICD-10-CM

## 2025-07-11 DIAGNOSIS — E78.9 DISORDER OF LIPID METABOLISM: ICD-10-CM

## 2025-07-11 DIAGNOSIS — Z00.00 ROUTINE ADULT HEALTH MAINTENANCE: Primary | ICD-10-CM

## 2025-07-11 DIAGNOSIS — E55.9 VITAMIN D DEFICIENCY: ICD-10-CM

## 2025-07-11 DIAGNOSIS — E78.2 MODERATE MIXED HYPERLIPIDEMIA NOT REQUIRING STATIN THERAPY: Primary | ICD-10-CM

## 2025-07-11 DIAGNOSIS — E66.3 OVERWEIGHT (BMI 25.0-29.9): ICD-10-CM

## 2025-07-11 DIAGNOSIS — Z00.00 ROUTINE ADULT HEALTH MAINTENANCE: ICD-10-CM

## 2025-07-11 LAB
25(OH)D3+25(OH)D2 SERPL-MCNC: 47 NG/ML (ref 30–96)
ABSOLUTE EOSINOPHIL (OHS): 0.24 K/UL
ABSOLUTE MONOCYTE (OHS): 0.32 K/UL (ref 0.3–1)
ABSOLUTE NEUTROPHIL COUNT (OHS): 2.06 K/UL (ref 1.8–7.7)
ALBUMIN SERPL BCP-MCNC: 3.9 G/DL (ref 3.5–5.2)
ALP SERPL-CCNC: 59 UNIT/L (ref 40–150)
ALT SERPL W/O P-5'-P-CCNC: 13 UNIT/L (ref 10–44)
ANION GAP (OHS): 12 MMOL/L (ref 8–16)
AST SERPL-CCNC: 17 UNIT/L (ref 11–45)
BASOPHILS # BLD AUTO: 0.03 K/UL
BASOPHILS NFR BLD AUTO: 0.8 %
BILIRUB SERPL-MCNC: 0.6 MG/DL (ref 0.1–1)
BUN SERPL-MCNC: 15 MG/DL (ref 6–20)
CALCIUM SERPL-MCNC: 9.1 MG/DL (ref 8.7–10.5)
CHLORIDE SERPL-SCNC: 104 MMOL/L (ref 95–110)
CHOLEST SERPL-MCNC: 190 MG/DL (ref 120–199)
CHOLEST/HDLC SERPL: 2.7 {RATIO} (ref 2–5)
CO2 SERPL-SCNC: 26 MMOL/L (ref 23–29)
CREAT SERPL-MCNC: 1 MG/DL (ref 0.5–1.4)
EAG (OHS): 100 MG/DL (ref 68–131)
ERYTHROCYTE [DISTWIDTH] IN BLOOD BY AUTOMATED COUNT: 12.7 % (ref 11.5–14.5)
GFR SERPLBLD CREATININE-BSD FMLA CKD-EPI: >60 ML/MIN/1.73/M2
GLUCOSE SERPL-MCNC: 89 MG/DL (ref 70–110)
HBA1C MFR BLD: 5.1 % (ref 4–5.6)
HCT VFR BLD AUTO: 42 % (ref 40–54)
HDLC SERPL-MCNC: 71 MG/DL (ref 40–75)
HDLC SERPL: 37.4 % (ref 20–50)
HGB BLD-MCNC: 14.5 GM/DL (ref 14–18)
IMM GRANULOCYTES # BLD AUTO: 0.01 K/UL (ref 0–0.04)
IMM GRANULOCYTES NFR BLD AUTO: 0.3 % (ref 0–0.5)
LDLC SERPL CALC-MCNC: 108.2 MG/DL (ref 63–159)
LYMPHOCYTES # BLD AUTO: 1.17 K/UL (ref 1–4.8)
MCH RBC QN AUTO: 31.3 PG (ref 27–31)
MCHC RBC AUTO-ENTMCNC: 34.5 G/DL (ref 32–36)
MCV RBC AUTO: 91 FL (ref 82–98)
NONHDLC SERPL-MCNC: 119 MG/DL
NUCLEATED RBC (/100WBC) (OHS): 0 /100 WBC
PLATELET # BLD AUTO: 167 K/UL (ref 150–450)
PMV BLD AUTO: 11 FL (ref 9.2–12.9)
POTASSIUM SERPL-SCNC: 4.4 MMOL/L (ref 3.5–5.1)
PROT SERPL-MCNC: 7.2 GM/DL (ref 6–8.4)
PSA SERPL-MCNC: 0.22 NG/ML
RBC # BLD AUTO: 4.64 M/UL (ref 4.6–6.2)
RELATIVE EOSINOPHIL (OHS): 6.3 %
RELATIVE LYMPHOCYTE (OHS): 30.5 % (ref 18–48)
RELATIVE MONOCYTE (OHS): 8.4 % (ref 4–15)
RELATIVE NEUTROPHIL (OHS): 53.7 % (ref 38–73)
SODIUM SERPL-SCNC: 142 MMOL/L (ref 136–145)
TRIGL SERPL-MCNC: 54 MG/DL (ref 30–150)
TSH SERPL-ACNC: 1.99 UIU/ML (ref 0.4–4)
VIT B12 SERPL-MCNC: >2000 PG/ML (ref 210–950)
WBC # BLD AUTO: 3.83 K/UL (ref 3.9–12.7)

## 2025-07-11 PROCEDURE — 82607 VITAMIN B-12: CPT

## 2025-07-11 PROCEDURE — 82306 VITAMIN D 25 HYDROXY: CPT

## 2025-07-11 PROCEDURE — 84443 ASSAY THYROID STIM HORMONE: CPT

## 2025-07-11 PROCEDURE — 84153 ASSAY OF PSA TOTAL: CPT

## 2025-07-11 PROCEDURE — 85025 COMPLETE CBC W/AUTO DIFF WBC: CPT

## 2025-07-11 PROCEDURE — 83036 HEMOGLOBIN GLYCOSYLATED A1C: CPT

## 2025-07-11 PROCEDURE — 80053 COMPREHEN METABOLIC PANEL: CPT

## 2025-07-11 PROCEDURE — 36415 COLL VENOUS BLD VENIPUNCTURE: CPT

## 2025-07-11 PROCEDURE — 80061 LIPID PANEL: CPT

## 2025-07-11 NOTE — PROGRESS NOTES
Subjective     Patient ID: Dada Jean is a 54 y.o. male.    Chief Complaint: Annual Exam and Hyperlipidemia    He takes Tirzepatide 7.5 mg about every month to help maintain his current weight.  He has lost about 30 pounds over the past 2 years, which he is maintaining.        Adult Wellness Exam:    Mental Conditions: None  Depression Risk Factors: None  BMI: See Vital signs   Colon screen:    See Health Maintenance Report                                       Vaccines (Flu, Adacel, Shingrix): See Health Maintenance Report  Routine labs (Cholesterol, Glucose/Hgb A1C, and TSH): ordered.     The patient's current health status is: Good   Patient was educated on routine health maintenance. See Patient Instructions.                             Hyperlipidemia  This is a chronic problem. The current episode started more than 1 year ago. The problem is controlled.     Review of Systems   Constitutional: Negative.    Respiratory: Negative.     Cardiovascular: Negative.           Objective     Physical Exam  Vitals and nursing note reviewed.   Constitutional:       Appearance: He is well-developed.   HENT:      Head: Normocephalic and atraumatic.   Eyes:      Pupils: Pupils are equal, round, and reactive to light.   Cardiovascular:      Rate and Rhythm: Normal rate and regular rhythm.      Heart sounds: Normal heart sounds.   Pulmonary:      Effort: Pulmonary effort is normal.   Neurological:      Mental Status: He is alert.            Assessment and Plan     1. Moderate mixed hyperlipidemia not requiring statin therapy    2. Congenital neutropenia  Overview:  HIV neg - 6/20      3. Routine adult health maintenance    4. Overweight (BMI 25.0-29.9)  Overview:  Max jj=578 lb in 2016  Max mi=061 ib in early 2023          PLAN:  Per orders and D/C instructions.  Continue diet and/or meds for overweight, congenital neutropenia, and high cholesterol, which are stable.  Check routine labs.       Follow up in about 1 year  (around 7/11/2026).

## 2025-08-26 DIAGNOSIS — F41.9 ANXIETY: ICD-10-CM

## 2025-08-26 RX ORDER — ESCITALOPRAM OXALATE 10 MG/1
10 TABLET ORAL
Qty: 90 TABLET | Refills: 3 | Status: SHIPPED | OUTPATIENT
Start: 2025-08-26